# Patient Record
Sex: MALE | Race: WHITE | NOT HISPANIC OR LATINO | URBAN - METROPOLITAN AREA
[De-identification: names, ages, dates, MRNs, and addresses within clinical notes are randomized per-mention and may not be internally consistent; named-entity substitution may affect disease eponyms.]

---

## 2017-01-03 ENCOUNTER — INPATIENT (INPATIENT)
Facility: HOSPITAL | Age: 67
LOS: 7 days | Discharge: HOME | DRG: 872 | End: 2017-01-11
Attending: HOSPITALIST | Admitting: HOSPITALIST
Payer: MEDICARE

## 2017-01-03 VITALS
SYSTOLIC BLOOD PRESSURE: 146 MMHG | TEMPERATURE: 97 F | HEART RATE: 116 BPM | HEIGHT: 70 IN | WEIGHT: 184.97 LBS | RESPIRATION RATE: 22 BRPM | DIASTOLIC BLOOD PRESSURE: 95 MMHG | OXYGEN SATURATION: 98 %

## 2017-01-03 LAB
ALBUMIN SERPL ELPH-MCNC: 4.1 G/DL — SIGNIFICANT CHANGE UP (ref 3.3–5)
ALP SERPL-CCNC: 61 U/L — SIGNIFICANT CHANGE UP (ref 40–120)
ALT FLD-CCNC: 17 U/L RC — SIGNIFICANT CHANGE UP (ref 10–45)
ANION GAP SERPL CALC-SCNC: 23 MMOL/L — HIGH (ref 5–17)
APPEARANCE UR: ABNORMAL
APTT BLD: 27.7 SEC — SIGNIFICANT CHANGE UP (ref 27.5–37.4)
AST SERPL-CCNC: 19 U/L — SIGNIFICANT CHANGE UP (ref 10–40)
BASOPHILS # BLD AUTO: 0 K/UL — SIGNIFICANT CHANGE UP (ref 0–0.2)
BASOPHILS NFR BLD AUTO: 0 % — SIGNIFICANT CHANGE UP (ref 0–2)
BILIRUB SERPL-MCNC: 1.6 MG/DL — HIGH (ref 0.2–1.2)
BILIRUB UR-MCNC: ABNORMAL
BUN SERPL-MCNC: 44 MG/DL — HIGH (ref 7–23)
CALCIUM SERPL-MCNC: 9.7 MG/DL — SIGNIFICANT CHANGE UP (ref 8.4–10.5)
CHLORIDE SERPL-SCNC: 96 MMOL/L — SIGNIFICANT CHANGE UP (ref 96–108)
CO2 SERPL-SCNC: 17 MMOL/L — LOW (ref 22–31)
COLOR SPEC: YELLOW — SIGNIFICANT CHANGE UP
CREAT SERPL-MCNC: 2.01 MG/DL — HIGH (ref 0.5–1.3)
DIFF PNL FLD: ABNORMAL
EOSINOPHIL # BLD AUTO: 0 K/UL — SIGNIFICANT CHANGE UP (ref 0–0.5)
EOSINOPHIL NFR BLD AUTO: 0.1 % — SIGNIFICANT CHANGE UP (ref 0–6)
GAS PNL BLDV: SIGNIFICANT CHANGE UP
GLUCOSE SERPL-MCNC: 136 MG/DL — HIGH (ref 70–99)
GLUCOSE UR QL: NEGATIVE — SIGNIFICANT CHANGE UP
HCT VFR BLD CALC: 40.5 % — SIGNIFICANT CHANGE UP (ref 39–50)
HGB BLD-MCNC: 13.8 G/DL — SIGNIFICANT CHANGE UP (ref 13–17)
INR BLD: 1.14 RATIO — SIGNIFICANT CHANGE UP (ref 0.88–1.16)
KETONES UR-MCNC: ABNORMAL
LEUKOCYTE ESTERASE UR-ACNC: NEGATIVE — SIGNIFICANT CHANGE UP
LYMPHOCYTES # BLD AUTO: 0.6 K/UL — LOW (ref 1–3.3)
LYMPHOCYTES # BLD AUTO: 4.1 % — LOW (ref 13–44)
MCHC RBC-ENTMCNC: 31.5 PG — SIGNIFICANT CHANGE UP (ref 27–34)
MCHC RBC-ENTMCNC: 33.9 GM/DL — SIGNIFICANT CHANGE UP (ref 32–36)
MCV RBC AUTO: 92.9 FL — SIGNIFICANT CHANGE UP (ref 80–100)
MONOCYTES # BLD AUTO: 1.7 K/UL — HIGH (ref 0–0.9)
MONOCYTES NFR BLD AUTO: 11.2 % — SIGNIFICANT CHANGE UP (ref 2–14)
NEUTROPHILS # BLD AUTO: 12.8 K/UL — HIGH (ref 1.8–7.4)
NEUTROPHILS NFR BLD AUTO: 84.6 % — HIGH (ref 43–77)
NITRITE UR-MCNC: NEGATIVE — SIGNIFICANT CHANGE UP
PH UR: 6 — SIGNIFICANT CHANGE UP (ref 4.8–8)
PLATELET # BLD AUTO: 197 K/UL — SIGNIFICANT CHANGE UP (ref 150–400)
POTASSIUM SERPL-MCNC: 3.9 MMOL/L — SIGNIFICANT CHANGE UP (ref 3.5–5.3)
POTASSIUM SERPL-SCNC: 3.9 MMOL/L — SIGNIFICANT CHANGE UP (ref 3.5–5.3)
PROT SERPL-MCNC: 8.1 G/DL — SIGNIFICANT CHANGE UP (ref 6–8.3)
PROT UR-MCNC: 500 MG/DL
PROTHROM AB SERPL-ACNC: 12.3 SEC — SIGNIFICANT CHANGE UP (ref 10–13.1)
RAPID RVP RESULT: SIGNIFICANT CHANGE UP
RBC # BLD: 4.36 M/UL — SIGNIFICANT CHANGE UP (ref 4.2–5.8)
RBC # FLD: 12.5 % — SIGNIFICANT CHANGE UP (ref 10.3–14.5)
SODIUM SERPL-SCNC: 136 MMOL/L — SIGNIFICANT CHANGE UP (ref 135–145)
SP GR SPEC: 1.02 — SIGNIFICANT CHANGE UP (ref 1.01–1.02)
UROBILINOGEN FLD QL: 2
WBC # BLD: 15.1 K/UL — HIGH (ref 3.8–10.5)
WBC # FLD AUTO: 15.1 K/UL — HIGH (ref 3.8–10.5)

## 2017-01-03 PROCEDURE — 74020: CPT | Mod: 26

## 2017-01-03 PROCEDURE — 93010 ELECTROCARDIOGRAM REPORT: CPT

## 2017-01-03 PROCEDURE — 74176 CT ABD & PELVIS W/O CONTRAST: CPT | Mod: 26

## 2017-01-03 PROCEDURE — 99285 EMERGENCY DEPT VISIT HI MDM: CPT | Mod: 25

## 2017-01-03 RX ORDER — ACETAMINOPHEN 500 MG
1000 TABLET ORAL ONCE
Qty: 0 | Refills: 0 | Status: COMPLETED | OUTPATIENT
Start: 2017-01-03 | End: 2017-01-03

## 2017-01-03 RX ORDER — HEPARIN SODIUM 5000 [USP'U]/ML
5000 INJECTION INTRAVENOUS; SUBCUTANEOUS ONCE
Qty: 0 | Refills: 0 | Status: COMPLETED | OUTPATIENT
Start: 2017-01-03 | End: 2017-01-03

## 2017-01-03 RX ORDER — SODIUM CHLORIDE 9 MG/ML
1000 INJECTION INTRAMUSCULAR; INTRAVENOUS; SUBCUTANEOUS ONCE
Qty: 0 | Refills: 0 | Status: COMPLETED | OUTPATIENT
Start: 2017-01-03 | End: 2017-01-03

## 2017-01-03 RX ADMIN — Medication 400 MILLIGRAM(S): at 21:12

## 2017-01-03 RX ADMIN — SODIUM CHLORIDE 2000 MILLILITER(S): 9 INJECTION INTRAMUSCULAR; INTRAVENOUS; SUBCUTANEOUS at 21:12

## 2017-01-03 NOTE — ED PROVIDER NOTE - MEDICAL DECISION MAKING DETAILS
Resident: abdominal pain, distention. Hx echinoccocus renal dysfunction, on cellcept. febrile. abdominal is distended, tympanic, tenderness to palpation in LLQ, right lower quadrant, epigastrium. Concern for intraabdominal infection, will get XR and CT abdominal, cbc, cmp, lipase, blood cultures, ua, ucx, coags, rvp, likely admit. Resident: 65 yo M with Hx echinococcus and renal dysfunction on cellcept here with abdominal pain, and distention; found to be febrile in the ED.  PE: abdominal is distended, tympanic, tenderness to palpation in LLQ, right lower quadrant, epigastrium. Concern for intraabdominal infection, will get XR and CT abdominal, cbc, cmp, lipase, blood cultures, ua, ucx, coags, rvp, admit. Oniel: 67 yo M with Hx echinococcus and renal dysfunction on cellcept here with abdominal pain, and distention; found to be febrile in the ED.  PE: abdominal is distended, tympanic, tenderness to palpation in LLQ, right lower quadrant, epigastrium. Concern for intraabdominal infection, will get XR and CT abdominal, cbc, cmp, lipase, blood cultures, ua, ucx, coags, rvp, admit.

## 2017-01-03 NOTE — ED PROVIDER NOTE - ATTENDING CONTRIBUTION TO CARE
I have personally seen and examined this patient. I have fully participated in the care of the patient. I have reviewed all pertinent clinical information, including history, physical exam, plan and the Resident's note and agree except as noted in the MDM.

## 2017-01-03 NOTE — ED PROVIDER NOTE - CONSTITUTIONAL, MLM
normal... Tired, well nourished, awake, alert, oriented to person, place, time/situation and in no apparent distress.

## 2017-01-03 NOTE — ED PROVIDER NOTE - PROGRESS NOTE DETAILS
Oniel: found to have thrombophlebitis on the CT of the infrarenal inferior   vena cava, bilateral common iliac veins, and bilateral external iliac   veins; discussed with surgery who consulted and recommended one bolus of heparin but no surgical intervention at this time with request for admission to medicine for fever and abdominal pain.

## 2017-01-03 NOTE — ED ADULT NURSE NOTE - OBJECTIVE STATEMENT
66 year old male with co cough generalized body aches and pain worse in the supra pubic area x about 1 week, seen by PMD sent in for further evaluation. denies fever or chills no cp or sob positive coughing dry non productive lungs cta no CP abd soft mild tenderness supra publicly bilateral. reports some discomfort on urination no blood in urine pending MD exam will continue to monitor

## 2017-01-03 NOTE — ED PROVIDER NOTE - OBJECTIVE STATEMENT
Resident: 66y M PMH Hx echinococcus s/p cyst resection, kidney dysfunction on Cellcept presents with lower abdominal pain x 1wk. Pain started when he returned from [facility], is constant, severe, wraps around to lower back, associated with loss of appetite. Also complains of non-productive cough, rhinorrhea. Was seen by urgent med several days ago, was prescribed nasal spray and pills which he cannot remember. Denies fever, chills.    Nati White, PMD Eron Lemos

## 2017-01-03 NOTE — ED PROVIDER NOTE - NOTES
Covering physician [phone] Covering physician [phone], discussed patient's condition with Dr. Arias. Resident: Spoke to covering nephrologist, who read through patient's records. 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 Receives Cellcept 250mg bid. Last Cr 1.8 from oct 28 2016.

## 2017-01-04 DIAGNOSIS — R10.9 UNSPECIFIED ABDOMINAL PAIN: ICD-10-CM

## 2017-01-04 DIAGNOSIS — N28.9 DISORDER OF KIDNEY AND URETER, UNSPECIFIED: ICD-10-CM

## 2017-01-04 DIAGNOSIS — I10 ESSENTIAL (PRIMARY) HYPERTENSION: ICD-10-CM

## 2017-01-04 DIAGNOSIS — A41.9 SEPSIS, UNSPECIFIED ORGANISM: ICD-10-CM

## 2017-01-04 DIAGNOSIS — N17.9 ACUTE KIDNEY FAILURE, UNSPECIFIED: ICD-10-CM

## 2017-01-04 DIAGNOSIS — Z41.8 ENCOUNTER FOR OTHER PROCEDURES FOR PURPOSES OTHER THAN REMEDYING HEALTH STATE: ICD-10-CM

## 2017-01-04 DIAGNOSIS — R05 COUGH: ICD-10-CM

## 2017-01-04 LAB
CULTURE RESULTS: SIGNIFICANT CHANGE UP
HAV IGM SER-ACNC: SIGNIFICANT CHANGE UP
HBV CORE IGM SER-ACNC: SIGNIFICANT CHANGE UP
HBV SURFACE AB SER-ACNC: SIGNIFICANT CHANGE UP
HBV SURFACE AG SER-ACNC: SIGNIFICANT CHANGE UP
HCV AB S/CO SERPL IA: 0.17 S/CO — SIGNIFICANT CHANGE UP
HCV AB SERPL-IMP: SIGNIFICANT CHANGE UP
SPECIMEN SOURCE: SIGNIFICANT CHANGE UP

## 2017-01-04 PROCEDURE — 99222 1ST HOSP IP/OBS MODERATE 55: CPT

## 2017-01-04 PROCEDURE — 99223 1ST HOSP IP/OBS HIGH 75: CPT | Mod: GC

## 2017-01-04 PROCEDURE — 74185 MRA ABD W OR W/O CNTRST: CPT | Mod: 26

## 2017-01-04 PROCEDURE — 71010: CPT | Mod: 26

## 2017-01-04 PROCEDURE — 93970 EXTREMITY STUDY: CPT | Mod: 26

## 2017-01-04 RX ORDER — SENNA PLUS 8.6 MG/1
1 TABLET ORAL DAILY
Qty: 0 | Refills: 0 | Status: DISCONTINUED | OUTPATIENT
Start: 2017-01-04 | End: 2017-01-11

## 2017-01-04 RX ORDER — PIPERACILLIN AND TAZOBACTAM 4; .5 G/20ML; G/20ML
3.38 INJECTION, POWDER, LYOPHILIZED, FOR SOLUTION INTRAVENOUS EVERY 12 HOURS
Qty: 0 | Refills: 0 | Status: DISCONTINUED | OUTPATIENT
Start: 2017-01-04 | End: 2017-01-05

## 2017-01-04 RX ORDER — LANOLIN ALCOHOL/MO/W.PET/CERES
3 CREAM (GRAM) TOPICAL AT BEDTIME
Qty: 0 | Refills: 0 | Status: DISCONTINUED | OUTPATIENT
Start: 2017-01-04 | End: 2017-01-11

## 2017-01-04 RX ORDER — ACETAMINOPHEN 500 MG
650 TABLET ORAL EVERY 6 HOURS
Qty: 0 | Refills: 0 | Status: DISCONTINUED | OUTPATIENT
Start: 2017-01-04 | End: 2017-01-07

## 2017-01-04 RX ORDER — MYCOPHENOLATE MOFETIL 250 MG/1
250 CAPSULE ORAL
Qty: 0 | Refills: 0 | Status: DISCONTINUED | OUTPATIENT
Start: 2017-01-04 | End: 2017-01-11

## 2017-01-04 RX ORDER — HEPARIN SODIUM 5000 [USP'U]/ML
6500 INJECTION INTRAVENOUS; SUBCUTANEOUS EVERY 6 HOURS
Qty: 0 | Refills: 0 | Status: DISCONTINUED | OUTPATIENT
Start: 2017-01-04 | End: 2017-01-10

## 2017-01-04 RX ORDER — HEPARIN SODIUM 5000 [USP'U]/ML
5000 INJECTION INTRAVENOUS; SUBCUTANEOUS EVERY 8 HOURS
Qty: 0 | Refills: 0 | Status: DISCONTINUED | OUTPATIENT
Start: 2017-01-04 | End: 2017-01-04

## 2017-01-04 RX ORDER — LOSARTAN POTASSIUM 100 MG/1
25 TABLET, FILM COATED ORAL DAILY
Qty: 0 | Refills: 0 | Status: DISCONTINUED | OUTPATIENT
Start: 2017-01-04 | End: 2017-01-08

## 2017-01-04 RX ORDER — PIPERACILLIN AND TAZOBACTAM 4; .5 G/20ML; G/20ML
3.38 INJECTION, POWDER, LYOPHILIZED, FOR SOLUTION INTRAVENOUS ONCE
Qty: 0 | Refills: 0 | Status: COMPLETED | OUTPATIENT
Start: 2017-01-04 | End: 2017-01-04

## 2017-01-04 RX ORDER — HEPARIN SODIUM 5000 [USP'U]/ML
3000 INJECTION INTRAVENOUS; SUBCUTANEOUS EVERY 6 HOURS
Qty: 0 | Refills: 0 | Status: DISCONTINUED | OUTPATIENT
Start: 2017-01-04 | End: 2017-01-10

## 2017-01-04 RX ORDER — SODIUM CHLORIDE 9 MG/ML
1000 INJECTION INTRAMUSCULAR; INTRAVENOUS; SUBCUTANEOUS
Qty: 0 | Refills: 0 | Status: DISCONTINUED | OUTPATIENT
Start: 2017-01-04 | End: 2017-01-09

## 2017-01-04 RX ORDER — PANTOPRAZOLE SODIUM 20 MG/1
40 TABLET, DELAYED RELEASE ORAL
Qty: 0 | Refills: 0 | Status: DISCONTINUED | OUTPATIENT
Start: 2017-01-04 | End: 2017-01-11

## 2017-01-04 RX ORDER — HEPARIN SODIUM 5000 [USP'U]/ML
INJECTION INTRAVENOUS; SUBCUTANEOUS
Qty: 25000 | Refills: 0 | Status: DISCONTINUED | OUTPATIENT
Start: 2017-01-04 | End: 2017-01-10

## 2017-01-04 RX ORDER — VANCOMYCIN HCL 1 G
1000 VIAL (EA) INTRAVENOUS ONCE
Qty: 0 | Refills: 0 | Status: COMPLETED | OUTPATIENT
Start: 2017-01-04 | End: 2017-01-04

## 2017-01-04 RX ORDER — DOCUSATE SODIUM 100 MG
100 CAPSULE ORAL DAILY
Qty: 0 | Refills: 0 | Status: DISCONTINUED | OUTPATIENT
Start: 2017-01-04 | End: 2017-01-11

## 2017-01-04 RX ADMIN — Medication 1 TABLET(S): at 14:20

## 2017-01-04 RX ADMIN — Medication 200 MILLIGRAM(S): at 14:20

## 2017-01-04 RX ADMIN — Medication 200 MILLIGRAM(S): at 23:37

## 2017-01-04 RX ADMIN — Medication 250 MILLIGRAM(S): at 19:59

## 2017-01-04 RX ADMIN — SODIUM CHLORIDE 75 MILLILITER(S): 9 INJECTION INTRAMUSCULAR; INTRAVENOUS; SUBCUTANEOUS at 19:59

## 2017-01-04 RX ADMIN — Medication 3 MILLIGRAM(S): at 23:59

## 2017-01-04 RX ADMIN — HEPARIN SODIUM 5000 UNIT(S): 5000 INJECTION INTRAVENOUS; SUBCUTANEOUS at 00:37

## 2017-01-04 RX ADMIN — MYCOPHENOLATE MOFETIL 250 MILLIGRAM(S): 250 CAPSULE ORAL at 23:37

## 2017-01-04 RX ADMIN — HEPARIN SODIUM 1500 UNIT(S)/HR: 5000 INJECTION INTRAVENOUS; SUBCUTANEOUS at 16:28

## 2017-01-04 RX ADMIN — PANTOPRAZOLE SODIUM 40 MILLIGRAM(S): 20 TABLET, DELAYED RELEASE ORAL at 11:00

## 2017-01-04 RX ADMIN — PIPERACILLIN AND TAZOBACTAM 200 GRAM(S): 4; .5 INJECTION, POWDER, LYOPHILIZED, FOR SOLUTION INTRAVENOUS at 01:57

## 2017-01-04 RX ADMIN — HEPARIN SODIUM 5000 UNIT(S): 5000 INJECTION INTRAVENOUS; SUBCUTANEOUS at 14:20

## 2017-01-04 RX ADMIN — SENNA PLUS 1 TABLET(S): 8.6 TABLET ORAL at 14:19

## 2017-01-04 RX ADMIN — LOSARTAN POTASSIUM 25 MILLIGRAM(S): 100 TABLET, FILM COATED ORAL at 14:20

## 2017-01-04 RX ADMIN — Medication 100 MILLIGRAM(S): at 14:19

## 2017-01-04 RX ADMIN — SODIUM CHLORIDE 75 MILLILITER(S): 9 INJECTION INTRAMUSCULAR; INTRAVENOUS; SUBCUTANEOUS at 11:00

## 2017-01-04 NOTE — DISCHARGE NOTE ADULT - HOSPITAL COURSE
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 CT abdomen/pelvis: Findings are suspicious for thrombophlebitis of the infrarenal inferior vena cava, bilateral common iliac veins, and bilateral external iliac veins. Questionable thickened appearing ascending colon.       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 CT abdomen/pelvis: Findings are suspicious for thrombophlebitis of the infrarenal inferior vena cava, bilateral common iliac veins, and bilateral external iliac veins. Questionable thickened appearing ascending colon. ID was consulted and patient was started on vancomycin and zosyn for thrombophlebitis read on CT abd/pelvis. It was dc'd after ID believed there was not true thrombophlebitis but instead DVT. Patient was started on augmentin for asymptomatic pyuria. Confirmatory test for DVT with MRV showed extensive thrombus within bilateral internal/external iliac veins and bilateral common iliac veins extending into the suprarenal inferior vena cava. Duplex of b/l LE showed There is extensive deep venous thrombosis involving the right external iliac vein, common femoral vein, femoral vein, gastrocnemius vein, popliteal, tibioperoneal trunk, posterior tibial vein and peroneal veins. These veins are noncompressible. There is extensive deep venous thrombosis involving the left external iliac vein, common femoral vein, femoral vein, popliteal vein, and left calf veins. Patient was started on heparin drip. Vascular surgery stated since the clot extends into the IVC and thus wouldn't be a candidate for IVC filter and he is a poor candidate for surgical embolectomy. IR was consulted for possible intervention.         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 CT abdomen/pelvis: Findings are suspicious for thrombophlebitis of the infrarenal inferior vena cava, bilateral common iliac veins, and bilateral external iliac veins. Questionable thickened appearing ascending colon. ID was consulted and patient was started on vancomycin and zosyn for thrombophlebitis read on CT abd/pelvis. It was dc'd after ID believed there was not true thrombophlebitis but instead DVT. Confirmatory test for DVT with MRV showed extensive thrombus within bilateral internal/external iliac veins and bilateral common iliac veins extending into the suprarenal inferior vena cava. Duplex of b/l LE showed There is extensive deep venous thrombosis involving the right external iliac vein, common femoral vein, femoral vein, gastrocnemius vein, popliteal, tibioperoneal trunk, posterior tibial vein and peroneal veins. These veins are noncompressible. There is extensive deep venous thrombosis involving the left external iliac vein, common femoral vein, femoral vein, popliteal vein, and left calf veins. Patient was started on heparin drip. Vascular surgery stated since the clot extends into the IVC and thus wouldn't be a candidate for IVC filter and he is a poor candidate for surgical embolectomy. IR was consulted for possible intervention.         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 CT abdomen/pelvis: Findings are suspicious for thrombophlebitis of the infrarenal inferior vena cava, bilateral common iliac veins, and bilateral external iliac veins. Questionable thickened appearing ascending colon. ID was consulted and patient was started on vancomycin and zosyn for thrombophlebitis read on CT abd/pelvis. It was dc'd after ID believed there was not true thrombophlebitis but instead DVT. Confirmatory test for DVT with MRV showed extensive thrombus within bilateral internal/external iliac veins and bilateral common iliac veins extending into the suprarenal inferior vena cava. Duplex of b/l LE showed There is extensive deep venous thrombosis involving the right external iliac vein, common femoral vein, femoral vein, gastrocnemius vein, popliteal, tibioperoneal trunk, posterior tibial vein and peroneal veins. These veins are noncompressible. There is extensive deep venous thrombosis involving the left external iliac vein, common femoral vein, femoral vein, popliteal vein, and left calf veins. Patient was started on heparin drip. Vascular surgery stated since the clot extends into the IVC and thus wouldn't be a candidate for IVC filter and he is a poor candidate for surgical embolectomy. IR was consulted for possible intervention.     GI was curbsided and said that patient should follow up outpatient with his GI doctor for possible colonoscopy.       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 CT abdomen/pelvis: Findings are suspicious for thrombophlebitis of the infrarenal inferior vena cava, bilateral common iliac veins, and bilateral external iliac veins. Questionable thickened appearing ascending colon. ID was consulted and patient was started on vancomycin and zosyn for thrombophlebitis read on CT abd/pelvis. It was dc'd after ID believed there was not true thrombophlebitis but instead DVT. Confirmatory test for DVT with MRV showed extensive thrombus within bilateral internal/external iliac veins and bilateral common iliac veins extending into the suprarenal inferior vena cava. Duplex of b/l LE showed There is extensive deep venous thrombosis involving the right external iliac vein, common femoral vein, femoral vein, gastrocnemius vein, popliteal, tibioperoneal trunk, posterior tibial vein and peroneal veins. These veins are noncompressible. There is extensive deep venous thrombosis involving the left external iliac vein, common femoral vein, femoral vein, popliteal vein, and left calf veins. Patient was started on heparin drip. Vascular surgery stated since the clot extends into the IVC and thus wouldn't be a candidate for IVC filter and he is a poor candidate for surgical embolectomy. IR was consulted for possible intervention.     Nephrology was consulted for management of CKD with need for contrast for catheter directed thrombolysis.     GI was consulted for segmental colon thickening seen on CT abd/pelvis and they recommended follow up outpatient with his GI doctor for possible colonoscopy.       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 CT abdomen/pelvis: Findings are suspicious for thrombophlebitis of the infrarenal inferior vena cava, bilateral common iliac veins, and bilateral external iliac veins. Questionable thickened appearing ascending colon. ID was consulted and patient was started on vancomycin and zosyn for thrombophlebitis read on CT abd/pelvis. It was dc'd after ID believed there was not true thrombophlebitis but instead DVT. HIV, hep neg. Confirmatory test for DVT with MRV showed extensive thrombus within bilateral internal/external iliac veins and bilateral common iliac veins extending into the suprarenal inferior vena cava. Duplex of b/l LE showed There is extensive deep venous thrombosis involving the right external iliac vein, common femoral vein, femoral vein, gastrocnemius vein, popliteal, tibioperoneal trunk, posterior tibial vein and peroneal veins. These veins are noncompressible. There is extensive deep venous thrombosis involving the left external iliac vein, common femoral vein, femoral vein, popliteal vein, and left calf veins. Patient was started on heparin drip. Vascular surgery stated since the clot extends into the IVC and thus wouldn't be a candidate for IVC filter. IR and vasc surgery collaborated and agreed that patient is not a candidate for catheter directed thrombolysis, because risks outweigh the benefits and patient has minimal symptoms. Nephrology was consulted for management of CKD with need for contrast for catheter directed thrombolysis; however, patient ultimately did not have the procedure. GI was consulted for segmental colon thickening seen on CT abd/pelvis and they recommended follow up outpatient with his GI doctor for possible colonoscopy. Hematology stated that hypercoag workup is completed outpatient and patient was given contact information for house hematology.    During this hospitalization Cr continued to downtrend past baseline. It was decided that patient can be started on xarelto vs. warfarin. Heparin was stopped and patient was started on xarelto. 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 CT abdomen/pelvis: Findings are suspicious for thrombophlebitis of the infrarenal inferior vena cava, bilateral common iliac veins, and bilateral external iliac veins. Questionable thickened appearing ascending colon. ID was consulted and patient was started on vancomycin and zosyn for thrombophlebitis read on CT abd/pelvis. It was dc'd after ID believed there was not true thrombophlebitis but instead DVT. HIV, hep neg. Confirmatory test for DVT with MRV showed extensive thrombus within bilateral internal/external iliac veins and bilateral common iliac veins extending into the suprarenal inferior vena cava. Duplex of b/l LE showed There is extensive deep venous thrombosis involving the right external iliac vein, common femoral vein, femoral vein, gastrocnemius vein, popliteal, tibioperoneal trunk, posterior tibial vein and peroneal veins. These veins are noncompressible. There is extensive deep venous thrombosis involving the left external iliac vein, common femoral vein, femoral vein, popliteal vein, and left calf veins. TTE showed no RV strain, EF 70%. Patient was started on heparin drip. Vascular surgery stated since the clot extends into the IVC and thus wouldn't be a candidate for IVC filter. IR and vasc surgery collaborated and agreed that patient is not a candidate for catheter directed thrombolysis, because risks outweigh the benefits and patient has minimal symptoms. Nephrology was consulted for management of CKD with need for contrast for catheter directed thrombolysis; however, patient ultimately did not have the procedure. GI was consulted for segmental colon thickening seen on CT abd/pelvis and they recommended follow up outpatient with his GI doctor for possible colonoscopy. Hematology stated that hypercoag workup is completed outpatient and patient was given contact information for house hematology.    During this hospitalization Cr continued to downtrend past baseline. It was decided that patient can be started on xarelto vs. warfarin. Heparin was stopped and patient was started on xarelto.

## 2017-01-04 NOTE — H&P ADULT. - PROBLEM SELECTOR PLAN 1
maybe 22/ to CT abd/pelvis finding of poss thrombophlebitis of infrarenal IVC, common iliac vein, and external iliac vein  VS. UTI  vasc sx recs    augmentin On admission had T: 101.5, HR: 107, WBC 15.1  s/p IL LR, now resolved.  f/u blood and urine cx On admission had T: 101.5, HR: 107, WBC 15.1  s/p IL NS, now resolved.  f/u blood and urine cx

## 2017-01-04 NOTE — H&P ADULT. - RS GEN PE MLT RESP DETAILS PC
respirations non-labored/transmitted upper airway sounds/airway patent/good air movement/breath sounds equal/normal

## 2017-01-04 NOTE — DISCHARGE NOTE ADULT - PATIENT PORTAL LINK FT
“You can access the [facility] Patient Portal, offered by [facility], by registering with the following website: [email or web address]”

## 2017-01-04 NOTE — H&P ADULT. - PROBLEM SELECTOR PLAN 3
trend Cr.   c/w home cellcept  avoid nephrotoxic agents check RVP, Flu   symptomatic control with robitussin RVP negative  CXR clear   symptomatic control with robitussin

## 2017-01-04 NOTE — ED ADULT NURSE REASSESSMENT NOTE - NS ED NURSE REASSESS COMMENT FT1
Pt AAOx4, NAD, resting comfortably in bed. Pt c/o intermittent 4/10 lower abdominal cramping. Pt reports improvement in fevers, chills, weakness, body aches. Pt denies headache, dizziness, chest pain, cough, SOB, n/v/d, urinary symptoms at this time. Pt awaiting bed, safety maintained, report given to ED Holding JESSICA Hewitt.

## 2017-01-04 NOTE — DISCHARGE NOTE ADULT - PLAN OF CARE
On CT abd/pelvis you had possible thickening of your ascending colon, please follow up with your GI doctor outpatient for possible colonoscopy. medication compliance You came in with abdominal and back pain You came in with abdominal and back pain. CT abdomen/pelvis showed extensive clots in your central veins including your inferior vena cava, common iliac vein, and external iliac vein. Ultrasound of your legs showed extensive clots in both legs. You were started on IV blood thinners. Vascular surgery said you weren't a candidate for surgery and interventional radiology said ____ On admission, labs showed that you had some kidney injury. Nephrology was consulted because catheter directed thrombolysis needed contrast which can injury kidneys. follow up with your You came in with abdominal and back pain. CT abdomen/pelvis showed extensive clots in your central veins including your inferior vena cava, common iliac vein, and external iliac vein. Ultrasound of your legs showed extensive clots in both legs. You were started on IV blood thinners. Vascular surgery said interventional radiology stated you weren't a candidate for intervention. On admission, labs showed that you had some kidney injury. Nephrology was consulted because catheter directed thrombolysis needed contrast which can injury kidneys; however, you did not undergo the procedure. During this hospitalization, it was shown that your kidney levels were improving past your baseline. Please follow up with your nephrologist in 1 week. You came in with abdominal and back pain. CT abdomen/pelvis showed extensive clots in your central veins including your inferior vena cava, common iliac vein, and external iliac vein. Ultrasound of your legs showed extensive clots in both legs. You were started on IV blood thinners. Vascular surgery said interventional radiology stated you weren't a candidate for intervention. Please continue to take 15mg xarelto twice a day for 21 days and then transition to 10mg xarelto once a day. Please follow up with your PMD in 1 week. Please follow up with hematology by calling [phone] for hypercoagulable workup outpatient. Please continue taking your home cozaar. Please follow up with your PMD in 1 week. Follow up with your nephrologist On admission, labs showed that you had some kidney injury. Nephrology was consulted because catheter directed thrombolysis needed contrast which can injury kidneys; however, you did not undergo the procedure. During this hospitalization, it was shown that your kidney levels were improving past your baseline to a Creatinine of 1.2. Please follow up with your nephrologist in 1 week. You came in with abdominal and back pain. CT abdomen/pelvis showed extensive clots in your central veins including your inferior vena cava, common iliac vein, and external iliac vein. Ultrasound of your legs showed extensive clots in both legs. You were started on IV blood thinners. Vascular surgery said interventional radiology stated you weren't a candidate for intervention. Please continue to take 15mg xarelto twice a day for 21 days and then transition to 20mg xarelto once a day. Please follow up with your PMD in 1 week. Please follow up with hematology by calling [phone] for hypercoagulable workup outpatient. You came in with abdominal and back pain. CT abdomen/pelvis showed extensive clots in your central veins including your inferior vena cava, common iliac vein, and external iliac vein. Ultrasound of your legs showed extensive clots in both legs. You were started on IV blood thinners. Vascular surgery said interventional radiology stated you weren't a candidate for intervention. Ultrasound of your heart showed no heart strain and normal heart function. Please continue to take 15mg xarelto twice a day for 21 days and then transition to 20mg xarelto once a day. Please follow up with your PMD in 1 week. Please follow up with hematology by calling [phone] for hypercoagulable workup outpatient. Please follow up with your PMD in 1 week and discuss restarting your cozaar. follow up with PMD On transthoracic echo, it was found that you have a mildly dilated ascending aorta and mild-moderate aortic regurgitation. There is nothing to do in the acute setting. Please follow up with your PMD in 1 week.

## 2017-01-04 NOTE — H&P ADULT. - ATTENDING COMMENTS
#Thrombophlebitis involving IVC and common and external iliac veins:   -per vascular surgery, recommended to start heparin gtt  -will draw multiple blood cultures  -no recent central lines/hospital admissions. Unclear as to the cause of this thrombophlebitis.   -start empiric therapy with vanc/zosyn, given initial septic presentation   -ID and vascular surgery on board, appreciate recommendations    #UTI: follow up urine culture     #MALINDA: likely pre-renal, hydrate with IVF, avoid nephrotoxic drugs    #Cough: CXR negative for conslidations/effusions/infiltrates, treat symptomatically

## 2017-01-04 NOTE — ED ADULT NURSE REASSESSMENT NOTE - NS ED NURSE REASSESS COMMENT FT1
Report received from Shalini Wilson RN. Pt AAOx3, NAD, resting comfortably in bed, pt awaiting bed. Pt reports intermittent lower abdominal pain and back pain 6/10. Pt denies headache, dizziness, chest pain, SOB, n/v/d, urinary symptoms, leg pain, fevers, chills, weakness at this time. Pt awaiting bed, safety maintained.

## 2017-01-04 NOTE — H&P ADULT. - ASSESSMENT
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

## 2017-01-04 NOTE — H&P ADULT. - PROBLEM SELECTOR PLAN 2
check RVP, Flu   symptomatic control with robitussin maybe 22/ to CT abd/pelvis finding of poss thrombophlebitis of infrarenal IVC, common iliac vein, and external iliac vein  VS. UTI  vasc sx recs    augmentin maybe 22/ to CT abd/pelvis finding of poss thrombophlebitis of infrarenal IVC, common iliac vein, and external iliac vein  VS. UTI  vasc sx recs    augmentin  senna, colace

## 2017-01-04 NOTE — DISCHARGE NOTE ADULT - ADDITIONAL INSTRUCTIONS
Please follow up with hematology by calling [phone] Please follow up with your primary care physician within 1 week of discharge; check renal function with a BMP.  Please follow up with hematology by calling [phone]

## 2017-01-04 NOTE — H&P ADULT. - HISTORY OF PRESENT ILLNESS
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 As per patient he's been having this dry nonproductive cough since he was in [facility] but denies any other URI symptoms. No fevers, chills, congestion, sneezing, sore throat. Patient states he's had an episode like this 1 year ago at which time his doctor told him it may be allergies and it eventually resolved on it's own. Patient states that the day he returned from [facility] he had unbearable abdominal pain. Patient states that the pain starts in his lower back and radiates into his suprapubic abdomen. States it is a constant pain but the level of pain well increase and decrease with the most severe being 10/10 bloating like pain. Patient states that he was constipated but took 2 laxatives yesterday and had a small dark hard stool. This did not relieve his abdominal pain. States abdominal pain is not affected by food intake, but is much worse during coughing. Patient went to urgent care where he received an antispasmodic and some nasal spray which didn't help his symptoms. He started to have pain in his b/l LE extremities when walking stating that it is muscle pain. He called his PMD who told him to present to the hospital. Received flu vaccine. Patient had a c-scope 3 years ago which was negative. Never had any bright red blood per rectum. Denies any urinary symptoms. States he wakes up several times at night to urinate but that is his normal.     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 As per patient he's been having this dry nonproductive cough since he was in [facility] but denies any other URI symptoms. No fevers, chills, congestion, sneezing, sore throat. Patient states he's had an episode like this 1 year ago at which time his doctor told him it may be allergies and it eventually resolved on it's own. Patient states that the day he returned from [facility] he had unbearable abdominal pain. Patient states that the pain starts in his lower back and radiates into his suprapubic abdomen. States it is a constant pain but the level of pain well increase and decrease with the most severe being 10/10 bloating like pain. Patient states that he was constipated but took 2 laxatives yesterday and had a small dark hard stool. This did not relieve his abdominal pain. States abdominal pain is not affected by food intake, but is much worse during coughing. Patient went to urgent care where he received an antispasmodic and some nasal spray which didn't help his symptoms. He started to have pain in his b/l LE extremities when walking stating that it is muscle pain. He called his PMD who told him to present to the hospital. Received flu vaccine. Patient had a c-scope 3 years ago which was negative. Never had any bright red blood per rectum. Denies any urinary symptoms. States he wakes up several times at night to urinate but that is his normal.     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 As per patient he's been having this dry nonproductive cough since he was in [facility] but denies any other URI symptoms. No fevers, chills, congestion, sneezing, sore throat. Patient states he's had an episode like this 1 year ago at which time his doctor told him it may be allergies and it eventually resolved on it's own. Patient states that the day he returned from [facility] he had unbearable abdominal pain. Patient states that the pain starts in his lower back and radiates into his suprapubic abdomen. States it is a constant pain but the level of pain well increase and decrease with the most severe being 10/10 bloating like pain. Patient states that he was constipated but took 2 laxatives yesterday and had a small dark hard stool. This did not relieve his abdominal pain. States abdominal pain is not affected by food intake, but is much worse during coughing. Patient went to urgent care where he received an antispasmodic and some nasal spray which didn't help his symptoms. He started to have pain in his b/l LE extremities when walking stating that it is muscle pain. He called his PMD who told him to present to the hospital. Received flu vaccine. Patient had a c-scope 3 years ago which was negative. Never had any bright red blood per rectum. Denies any urinary symptoms. States he wakes up several times at night to urinate but that is his normal.     ED: T: 101.5  HR: 107  BP: 107/65  RR: 22 95% on RA  zosyn x1, IL NS, IV tylneol

## 2017-01-04 NOTE — DISCHARGE NOTE ADULT - CARE PLAN
Principal Discharge DX:	Abdominal pain  Secondary Diagnosis:	Colitis  Instructions for follow-up, activity and diet:	On CT abd/pelvis you had possible thickening of your ascending colon, please follow up with your GI doctor outpatient for possible colonoscopy. Principal Discharge DX:	Deep vein thrombosis  Goal:	medication compliance  Instructions for follow-up, activity and diet:	You came in with abdominal and back pain  Secondary Diagnosis:	Colitis  Instructions for follow-up, activity and diet:	On CT abd/pelvis you had possible thickening of your ascending colon, please follow up with your GI doctor outpatient for possible colonoscopy. Principal Discharge DX:	Deep vein thrombosis  Goal:	medication compliance  Instructions for follow-up, activity and diet:	You came in with abdominal and back pain. CT abdomen/pelvis showed extensive clots in your central veins including your inferior vena cava, common iliac vein, and external iliac vein. Ultrasound of your legs showed extensive clots in both legs. You were started on IV blood thinners. Vascular surgery said you weren't a candidate for surgery and interventional radiology said ____  Secondary Diagnosis:	Colitis  Instructions for follow-up, activity and diet:	On CT abd/pelvis you had possible thickening of your ascending colon, please follow up with your GI doctor outpatient for possible colonoscopy.  Secondary Diagnosis:	Hypertension Principal Discharge DX:	Deep vein thrombosis  Goal:	medication compliance  Instructions for follow-up, activity and diet:	You came in with abdominal and back pain. CT abdomen/pelvis showed extensive clots in your central veins including your inferior vena cava, common iliac vein, and external iliac vein. Ultrasound of your legs showed extensive clots in both legs. You were started on IV blood thinners. Vascular surgery said you weren't a candidate for surgery and interventional radiology said ____  Secondary Diagnosis:	Colitis  Instructions for follow-up, activity and diet:	On CT abd/pelvis you had possible thickening of your ascending colon, please follow up with your GI doctor outpatient for possible colonoscopy.  Secondary Diagnosis:	Hypertension  Secondary Diagnosis:	MALINDA (acute kidney injury)  Instructions for follow-up, activity and diet:	On admission, labs showed that you had some kidney injury. Nephrology was consulted because catheter directed thrombolysis needed contrast which can injury kidneys. Principal Discharge DX:	Deep vein thrombosis  Goal:	medication compliance  Instructions for follow-up, activity and diet:	You came in with abdominal and back pain. CT abdomen/pelvis showed extensive clots in your central veins including your inferior vena cava, common iliac vein, and external iliac vein. Ultrasound of your legs showed extensive clots in both legs. You were started on IV blood thinners. Vascular surgery said interventional radiology stated you weren't a candidate for intervention.  Secondary Diagnosis:	Colitis  Goal:	follow up with your  Instructions for follow-up, activity and diet:	On CT abd/pelvis you had possible thickening of your ascending colon, please follow up with your GI doctor outpatient for possible colonoscopy.  Secondary Diagnosis:	Hypertension  Secondary Diagnosis:	MALINDA (acute kidney injury)  Instructions for follow-up, activity and diet:	On admission, labs showed that you had some kidney injury. Nephrology was consulted because catheter directed thrombolysis needed contrast which can injury kidneys. Principal Discharge DX:	Deep vein thrombosis  Goal:	medication compliance  Instructions for follow-up, activity and diet:	You came in with abdominal and back pain. CT abdomen/pelvis showed extensive clots in your central veins including your inferior vena cava, common iliac vein, and external iliac vein. Ultrasound of your legs showed extensive clots in both legs. You were started on IV blood thinners. Vascular surgery said interventional radiology stated you weren't a candidate for intervention. Please continue to take 15mg xarelto twice a day for 21 days and then transition to 10mg xarelto once a day. Please follow up with your PMD in 1 week. Please follow up with hematology by calling [phone] for hypercoagulable workup outpatient.  Secondary Diagnosis:	Colitis  Goal:	follow up with your  Instructions for follow-up, activity and diet:	On CT abd/pelvis you had possible thickening of your ascending colon, please follow up with your GI doctor outpatient for possible colonoscopy.  Secondary Diagnosis:	Hypertension  Goal:	medication compliance  Secondary Diagnosis:	MALINDA (acute kidney injury)  Instructions for follow-up, activity and diet:	On admission, labs showed that you had some kidney injury. Nephrology was consulted because catheter directed thrombolysis needed contrast which can injury kidneys; however, you did not undergo the procedure. During this hospitalization, it was shown that your kidney levels were improving past your baseline. Please follow up with your nephrologist in 1 week. Principal Discharge DX:	Deep vein thrombosis  Goal:	medication compliance  Instructions for follow-up, activity and diet:	You came in with abdominal and back pain. CT abdomen/pelvis showed extensive clots in your central veins including your inferior vena cava, common iliac vein, and external iliac vein. Ultrasound of your legs showed extensive clots in both legs. You were started on IV blood thinners. Vascular surgery said interventional radiology stated you weren't a candidate for intervention. Please continue to take 15mg xarelto twice a day for 21 days and then transition to 20mg xarelto once a day. Please follow up with your PMD in 1 week. Please follow up with hematology by calling [phone] for hypercoagulable workup outpatient.  Secondary Diagnosis:	Colitis  Goal:	follow up with your  Instructions for follow-up, activity and diet:	On CT abd/pelvis you had possible thickening of your ascending colon, please follow up with your GI doctor outpatient for possible colonoscopy.  Secondary Diagnosis:	Hypertension  Goal:	medication compliance  Instructions for follow-up, activity and diet:	Please continue taking your home cozaar. Please follow up with your PMD in 1 week.  Secondary Diagnosis:	MALINDA (acute kidney injury)  Goal:	Follow up with your nephrologist  Instructions for follow-up, activity and diet:	On admission, labs showed that you had some kidney injury. Nephrology was consulted because catheter directed thrombolysis needed contrast which can injury kidneys; however, you did not undergo the procedure. During this hospitalization, it was shown that your kidney levels were improving past your baseline to a Creatinine of 1.2. Please follow up with your nephrologist in 1 week. Principal Discharge DX:	Deep vein thrombosis  Goal:	medication compliance  Instructions for follow-up, activity and diet:	You came in with abdominal and back pain. CT abdomen/pelvis showed extensive clots in your central veins including your inferior vena cava, common iliac vein, and external iliac vein. Ultrasound of your legs showed extensive clots in both legs. You were started on IV blood thinners. Vascular surgery said interventional radiology stated you weren't a candidate for intervention. Please continue to take 15mg xarelto twice a day for 21 days and then transition to 20mg xarelto once a day. Please follow up with your PMD in 1 week. Please follow up with hematology by calling [phone] for hypercoagulable workup outpatient.  Secondary Diagnosis:	Colitis  Goal:	follow up with your  Instructions for follow-up, activity and diet:	On CT abd/pelvis you had possible thickening of your ascending colon, please follow up with your GI doctor outpatient for possible colonoscopy.  Secondary Diagnosis:	Hypertension  Goal:	medication compliance  Instructions for follow-up, activity and diet:	Please continue taking your home cozaar. Please follow up with your PMD in 1 week.  Secondary Diagnosis:	MALINDA (acute kidney injury)  Goal:	Follow up with your nephrologist  Instructions for follow-up, activity and diet:	On admission, labs showed that you had some kidney injury. Nephrology was consulted because catheter directed thrombolysis needed contrast which can injury kidneys; however, you did not undergo the procedure. During this hospitalization, it was shown that your kidney levels were improving past your baseline to a Creatinine of 1.2. Please follow up with your nephrologist in 1 week. Principal Discharge DX:	Deep vein thrombosis  Goal:	medication compliance  Instructions for follow-up, activity and diet:	You came in with abdominal and back pain. CT abdomen/pelvis showed extensive clots in your central veins including your inferior vena cava, common iliac vein, and external iliac vein. Ultrasound of your legs showed extensive clots in both legs. You were started on IV blood thinners. Vascular surgery said interventional radiology stated you weren't a candidate for intervention. Please continue to take 15mg xarelto twice a day for 21 days and then transition to 20mg xarelto once a day. Please follow up with your PMD in 1 week. Please follow up with hematology by calling [phone] for hypercoagulable workup outpatient.  Secondary Diagnosis:	Colitis  Goal:	follow up with your  Instructions for follow-up, activity and diet:	On CT abd/pelvis you had possible thickening of your ascending colon, please follow up with your GI doctor outpatient for possible colonoscopy.  Secondary Diagnosis:	Hypertension  Goal:	medication compliance  Instructions for follow-up, activity and diet:	Please continue taking your home cozaar. Please follow up with your PMD in 1 week.  Secondary Diagnosis:	MALINDA (acute kidney injury)  Goal:	Follow up with your nephrologist  Instructions for follow-up, activity and diet:	On admission, labs showed that you had some kidney injury. Nephrology was consulted because catheter directed thrombolysis needed contrast which can injury kidneys; however, you did not undergo the procedure. During this hospitalization, it was shown that your kidney levels were improving past your baseline to a Creatinine of 1.2. Please follow up with your nephrologist in 1 week. Principal Discharge DX:	Deep vein thrombosis  Goal:	medication compliance  Instructions for follow-up, activity and diet:	You came in with abdominal and back pain. CT abdomen/pelvis showed extensive clots in your central veins including your inferior vena cava, common iliac vein, and external iliac vein. Ultrasound of your legs showed extensive clots in both legs. You were started on IV blood thinners. Vascular surgery said interventional radiology stated you weren't a candidate for intervention. Ultrasound of your heart showed no heart strain and normal heart function. Please continue to take 15mg xarelto twice a day for 21 days and then transition to 20mg xarelto once a day. Please follow up with your PMD in 1 week. Please follow up with hematology by calling [phone] for hypercoagulable workup outpatient.  Secondary Diagnosis:	Colitis  Goal:	follow up with your  Instructions for follow-up, activity and diet:	On CT abd/pelvis you had possible thickening of your ascending colon, please follow up with your GI doctor outpatient for possible colonoscopy.  Secondary Diagnosis:	Hypertension  Goal:	medication compliance  Instructions for follow-up, activity and diet:	Please continue taking your home cozaar. Please follow up with your PMD in 1 week.  Secondary Diagnosis:	MALINDA (acute kidney injury)  Goal:	Follow up with your nephrologist  Instructions for follow-up, activity and diet:	On admission, labs showed that you had some kidney injury. Nephrology was consulted because catheter directed thrombolysis needed contrast which can injury kidneys; however, you did not undergo the procedure. During this hospitalization, it was shown that your kidney levels were improving past your baseline to a Creatinine of 1.2. Please follow up with your nephrologist in 1 week. Principal Discharge DX:	Deep vein thrombosis  Goal:	medication compliance  Instructions for follow-up, activity and diet:	You came in with abdominal and back pain. CT abdomen/pelvis showed extensive clots in your central veins including your inferior vena cava, common iliac vein, and external iliac vein. Ultrasound of your legs showed extensive clots in both legs. You were started on IV blood thinners. Vascular surgery said interventional radiology stated you weren't a candidate for intervention. Ultrasound of your heart showed no heart strain and normal heart function. Please continue to take 15mg xarelto twice a day for 21 days and then transition to 20mg xarelto once a day. Please follow up with your PMD in 1 week. Please follow up with hematology by calling [phone] for hypercoagulable workup outpatient.  Secondary Diagnosis:	Colitis  Goal:	follow up with your  Instructions for follow-up, activity and diet:	On CT abd/pelvis you had possible thickening of your ascending colon, please follow up with your GI doctor outpatient for possible colonoscopy.  Secondary Diagnosis:	Hypertension  Goal:	medication compliance  Instructions for follow-up, activity and diet:	Please continue taking your home cozaar. Please follow up with your PMD in 1 week.  Secondary Diagnosis:	MALINDA (acute kidney injury)  Goal:	Follow up with your nephrologist  Instructions for follow-up, activity and diet:	On admission, labs showed that you had some kidney injury. Nephrology was consulted because catheter directed thrombolysis needed contrast which can injury kidneys; however, you did not undergo the procedure. During this hospitalization, it was shown that your kidney levels were improving past your baseline to a Creatinine of 1.2. Please follow up with your nephrologist in 1 week. Principal Discharge DX:	Deep vein thrombosis  Goal:	medication compliance  Instructions for follow-up, activity and diet:	You came in with abdominal and back pain. CT abdomen/pelvis showed extensive clots in your central veins including your inferior vena cava, common iliac vein, and external iliac vein. Ultrasound of your legs showed extensive clots in both legs. You were started on IV blood thinners. Vascular surgery said interventional radiology stated you weren't a candidate for intervention. Ultrasound of your heart showed no heart strain and normal heart function. Please continue to take 15mg xarelto twice a day for 21 days and then transition to 20mg xarelto once a day. Please follow up with your PMD in 1 week. Please follow up with hematology by calling [phone] for hypercoagulable workup outpatient.  Secondary Diagnosis:	Colitis  Goal:	follow up with your  Instructions for follow-up, activity and diet:	On CT abd/pelvis you had possible thickening of your ascending colon, please follow up with your GI doctor outpatient for possible colonoscopy.  Secondary Diagnosis:	Hypertension  Goal:	medication compliance  Instructions for follow-up, activity and diet:	Please follow up with your PMD in 1 week and discuss restarting your cozaar.  Secondary Diagnosis:	MALINDA (acute kidney injury)  Goal:	Follow up with your nephrologist  Instructions for follow-up, activity and diet:	On admission, labs showed that you had some kidney injury. Nephrology was consulted because catheter directed thrombolysis needed contrast which can injury kidneys; however, you did not undergo the procedure. During this hospitalization, it was shown that your kidney levels were improving past your baseline to a Creatinine of 1.2. Please follow up with your nephrologist in 1 week. Principal Discharge DX:	Deep vein thrombosis  Goal:	medication compliance  Instructions for follow-up, activity and diet:	You came in with abdominal and back pain. CT abdomen/pelvis showed extensive clots in your central veins including your inferior vena cava, common iliac vein, and external iliac vein. Ultrasound of your legs showed extensive clots in both legs. You were started on IV blood thinners. Vascular surgery said interventional radiology stated you weren't a candidate for intervention. Ultrasound of your heart showed no heart strain and normal heart function. Please continue to take 15mg xarelto twice a day for 21 days and then transition to 20mg xarelto once a day. Please follow up with your PMD in 1 week. Please follow up with hematology by calling [phone] for hypercoagulable workup outpatient.  Secondary Diagnosis:	Colitis  Goal:	follow up with your  Instructions for follow-up, activity and diet:	On CT abd/pelvis you had possible thickening of your ascending colon, please follow up with your GI doctor outpatient for possible colonoscopy.  Secondary Diagnosis:	Hypertension  Goal:	medication compliance  Instructions for follow-up, activity and diet:	Please follow up with your PMD in 1 week and discuss restarting your cozaar.  Secondary Diagnosis:	MALINDA (acute kidney injury)  Goal:	Follow up with your nephrologist  Instructions for follow-up, activity and diet:	On admission, labs showed that you had some kidney injury. Nephrology was consulted because catheter directed thrombolysis needed contrast which can injury kidneys; however, you did not undergo the procedure. During this hospitalization, it was shown that your kidney levels were improving past your baseline to a Creatinine of 1.2. Please follow up with your nephrologist in 1 week.  Secondary Diagnosis:	Aortic regurgitation  Goal:	follow up with PMD  Instructions for follow-up, activity and diet:	On transthoracic echo, it was found that you have a mildly dilated ascending aorta and mild-moderate aortic regurgitation. There is nothing to do in the acute setting. Please follow up with your PMD in 1 week. Principal Discharge DX:	Deep vein thrombosis  Goal:	medication compliance  Instructions for follow-up, activity and diet:	You came in with abdominal and back pain. CT abdomen/pelvis showed extensive clots in your central veins including your inferior vena cava, common iliac vein, and external iliac vein. Ultrasound of your legs showed extensive clots in both legs. You were started on IV blood thinners. Vascular surgery said interventional radiology stated you weren't a candidate for intervention. Ultrasound of your heart showed no heart strain and normal heart function. Please continue to take 15mg xarelto twice a day for 21 days and then transition to 20mg xarelto once a day. Please follow up with your PMD in 1 week. Please follow up with hematology by calling [phone] for hypercoagulable workup outpatient.  Secondary Diagnosis:	Colitis  Goal:	follow up with your  Instructions for follow-up, activity and diet:	On CT abd/pelvis you had possible thickening of your ascending colon, please follow up with your GI doctor outpatient for possible colonoscopy.  Secondary Diagnosis:	Hypertension  Goal:	medication compliance  Instructions for follow-up, activity and diet:	Please follow up with your PMD in 1 week and discuss restarting your cozaar.  Secondary Diagnosis:	MALINDA (acute kidney injury)  Goal:	Follow up with your nephrologist  Instructions for follow-up, activity and diet:	On admission, labs showed that you had some kidney injury. Nephrology was consulted because catheter directed thrombolysis needed contrast which can injury kidneys; however, you did not undergo the procedure. During this hospitalization, it was shown that your kidney levels were improving past your baseline to a Creatinine of 1.2. Please follow up with your nephrologist in 1 week.  Secondary Diagnosis:	Aortic regurgitation  Goal:	follow up with PMD  Instructions for follow-up, activity and diet:	On transthoracic echo, it was found that you have a mildly dilated ascending aorta and mild-moderate aortic regurgitation. There is nothing to do in the acute setting. Please follow up with your PMD in 1 week.

## 2017-01-04 NOTE — DISCHARGE NOTE ADULT - MEDICATION SUMMARY - MEDICATIONS TO TAKE
I will START or STAY ON the medications listed below when I get home from the hospital:    Cozaar  --  by mouth   -- Indication: For Hypertension    rivaroxaban 15 mg oral tablet  -- 1 tab(s) by mouth 2 times a day  -- Indication: For Deep vein thrombosis    loratadine 10 mg oral tablet  -- 1 tab(s) by mouth once a day  -- Indication: For Cough    benzonatate 100 mg oral capsule  -- 2 cap(s) by mouth 3 times a day  -- Indication: For Cough    CellCept  --  by mouth   -- Indication: For Kidney disease    fluticasone 50 mcg/inh nasal spray  -- 1 spray(s) into nose 2 times a day  -- Indication: For Cough    NexIUM  --  by mouth   -- Indication: For Gastroesophageal Reflux    Calcium 500+D  --  by mouth   -- Indication: For Need for prophylactic measure I will START or STAY ON the medications listed below when I get home from the hospital:    rivaroxaban 15 mg oral tablet  -- 1 tab(s) by mouth 2 times a day  -- Indication: For Deep vein thrombosis    loratadine 10 mg oral tablet  -- 1 tab(s) by mouth once a day  -- Indication: For Cough    benzonatate 100 mg oral capsule  -- 2 cap(s) by mouth 3 times a day  -- Indication: For Cough    CellCept  -- 250 milligram(s) by mouth 2 times a day  -- Indication: For Kidney disease    fluticasone 50 mcg/inh nasal spray  -- 1 spray(s) into nose 2 times a day  -- Indication: For Cough    NexIUM  -- 20 milligram(s) by mouth once a day  -- Indication: For GERD    Calcium 500+D  -- 1 tab(s) by mouth once a day  -- Indication: For Need for prophylactic measure

## 2017-01-04 NOTE — PROVIDER CONTACT NOTE (MEDICATION) - BACKGROUND
Pt transferred from [facility] @ 1748 on 1/4 to 5 juan. Hep gtt started @ 1628 at 15mL/hr but no bolus given as per order for aPTT of 27.7. Last aPTT was drawn on 1/3 @ 2110 = 27.7; no current aPTT drawn on

## 2017-01-04 NOTE — PROVIDER CONTACT NOTE (MEDICATION) - ASSESSMENT
1/4. Pt was given a 5000 unit IVP bolus of heparin in ED around 0030 on 1/4 and then a 5000 unit Hep SQ injection in [facility] around 1430 on 1/4.

## 2017-01-05 LAB
ANION GAP SERPL CALC-SCNC: 13 MMOL/L — SIGNIFICANT CHANGE UP (ref 5–17)
ANION GAP SERPL CALC-SCNC: 15 MMOL/L — SIGNIFICANT CHANGE UP (ref 5–17)
APTT BLD: 56.1 SEC — HIGH (ref 27.5–37.4)
APTT BLD: 57.7 SEC — HIGH (ref 27.5–37.4)
APTT BLD: 64.1 SEC — HIGH (ref 27.5–37.4)
APTT BLD: 66.3 SEC — HIGH (ref 27.5–37.4)
BASOPHILS # BLD AUTO: 0 K/UL — SIGNIFICANT CHANGE UP (ref 0–0.2)
BASOPHILS NFR BLD AUTO: 0.1 % — SIGNIFICANT CHANGE UP (ref 0–2)
BUN SERPL-MCNC: 39 MG/DL — HIGH (ref 7–23)
BUN SERPL-MCNC: 43 MG/DL — HIGH (ref 7–23)
CALCIUM SERPL-MCNC: 8.8 MG/DL — SIGNIFICANT CHANGE UP (ref 8.4–10.5)
CALCIUM SERPL-MCNC: 9.2 MG/DL — SIGNIFICANT CHANGE UP (ref 8.4–10.5)
CHLORIDE SERPL-SCNC: 101 MMOL/L — SIGNIFICANT CHANGE UP (ref 96–108)
CHLORIDE SERPL-SCNC: 102 MMOL/L — SIGNIFICANT CHANGE UP (ref 96–108)
CO2 SERPL-SCNC: 20 MMOL/L — LOW (ref 22–31)
CO2 SERPL-SCNC: 21 MMOL/L — LOW (ref 22–31)
CREAT SERPL-MCNC: 1.71 MG/DL — HIGH (ref 0.5–1.3)
CREAT SERPL-MCNC: 1.89 MG/DL — HIGH (ref 0.5–1.3)
EOSINOPHIL # BLD AUTO: 0 K/UL — SIGNIFICANT CHANGE UP (ref 0–0.5)
EOSINOPHIL NFR BLD AUTO: 0.4 % — SIGNIFICANT CHANGE UP (ref 0–6)
GLUCOSE SERPL-MCNC: 117 MG/DL — HIGH (ref 70–99)
GLUCOSE SERPL-MCNC: 122 MG/DL — HIGH (ref 70–99)
HCT VFR BLD CALC: 33.2 % — LOW (ref 39–50)
HCT VFR BLD CALC: 36.6 % — LOW (ref 39–50)
HGB BLD-MCNC: 11.6 G/DL — LOW (ref 13–17)
HGB BLD-MCNC: 12.5 G/DL — LOW (ref 13–17)
HIV 1+2 AB+HIV1 P24 AG SERPL QL IA: SIGNIFICANT CHANGE UP
LYMPHOCYTES # BLD AUTO: 0.7 K/UL — LOW (ref 1–3.3)
LYMPHOCYTES # BLD AUTO: 7 % — LOW (ref 13–44)
MCHC RBC-ENTMCNC: 32.1 PG — SIGNIFICANT CHANGE UP (ref 27–34)
MCHC RBC-ENTMCNC: 32.8 PG — SIGNIFICANT CHANGE UP (ref 27–34)
MCHC RBC-ENTMCNC: 34.1 GM/DL — SIGNIFICANT CHANGE UP (ref 32–36)
MCHC RBC-ENTMCNC: 34.9 GM/DL — SIGNIFICANT CHANGE UP (ref 32–36)
MCV RBC AUTO: 93.8 FL — SIGNIFICANT CHANGE UP (ref 80–100)
MCV RBC AUTO: 94.3 FL — SIGNIFICANT CHANGE UP (ref 80–100)
MONOCYTES # BLD AUTO: 1.2 K/UL — HIGH (ref 0–0.9)
MONOCYTES NFR BLD AUTO: 11.7 % — SIGNIFICANT CHANGE UP (ref 2–14)
NEUTROPHILS # BLD AUTO: 8 K/UL — HIGH (ref 1.8–7.4)
NEUTROPHILS NFR BLD AUTO: 80.8 % — HIGH (ref 43–77)
PLATELET # BLD AUTO: 177 K/UL — SIGNIFICANT CHANGE UP (ref 150–400)
PLATELET # BLD AUTO: 189 K/UL — SIGNIFICANT CHANGE UP (ref 150–400)
POTASSIUM SERPL-MCNC: 3.6 MMOL/L — SIGNIFICANT CHANGE UP (ref 3.5–5.3)
POTASSIUM SERPL-MCNC: 3.9 MMOL/L — SIGNIFICANT CHANGE UP (ref 3.5–5.3)
POTASSIUM SERPL-SCNC: 3.6 MMOL/L — SIGNIFICANT CHANGE UP (ref 3.5–5.3)
POTASSIUM SERPL-SCNC: 3.9 MMOL/L — SIGNIFICANT CHANGE UP (ref 3.5–5.3)
RBC # BLD: 3.54 M/UL — LOW (ref 4.2–5.8)
RBC # BLD: 3.89 M/UL — LOW (ref 4.2–5.8)
RBC # FLD: 12 % — SIGNIFICANT CHANGE UP (ref 10.3–14.5)
RBC # FLD: 12.2 % — SIGNIFICANT CHANGE UP (ref 10.3–14.5)
SODIUM SERPL-SCNC: 136 MMOL/L — SIGNIFICANT CHANGE UP (ref 135–145)
SODIUM SERPL-SCNC: 136 MMOL/L — SIGNIFICANT CHANGE UP (ref 135–145)
WBC # BLD: 10 K/UL — SIGNIFICANT CHANGE UP (ref 3.8–10.5)
WBC # BLD: 12.2 K/UL — HIGH (ref 3.8–10.5)
WBC # FLD AUTO: 10 K/UL — SIGNIFICANT CHANGE UP (ref 3.8–10.5)
WBC # FLD AUTO: 12.2 K/UL — HIGH (ref 3.8–10.5)

## 2017-01-05 PROCEDURE — 99233 SBSQ HOSP IP/OBS HIGH 50: CPT

## 2017-01-05 PROCEDURE — 99232 SBSQ HOSP IP/OBS MODERATE 35: CPT | Mod: GC

## 2017-01-05 RX ORDER — DIPHENHYDRAMINE HCL 50 MG
25 CAPSULE ORAL EVERY 6 HOURS
Qty: 0 | Refills: 0 | Status: DISCONTINUED | OUTPATIENT
Start: 2017-01-05 | End: 2017-01-07

## 2017-01-05 RX ADMIN — HEPARIN SODIUM 1500 UNIT(S)/HR: 5000 INJECTION INTRAVENOUS; SUBCUTANEOUS at 00:58

## 2017-01-05 RX ADMIN — PIPERACILLIN AND TAZOBACTAM 25 GRAM(S): 4; .5 INJECTION, POWDER, LYOPHILIZED, FOR SOLUTION INTRAVENOUS at 06:42

## 2017-01-05 RX ADMIN — MYCOPHENOLATE MOFETIL 250 MILLIGRAM(S): 250 CAPSULE ORAL at 16:29

## 2017-01-05 RX ADMIN — SODIUM CHLORIDE 75 MILLILITER(S): 9 INJECTION INTRAMUSCULAR; INTRAVENOUS; SUBCUTANEOUS at 12:23

## 2017-01-05 RX ADMIN — Medication 1 TABLET(S): at 16:29

## 2017-01-05 RX ADMIN — SODIUM CHLORIDE 75 MILLILITER(S): 9 INJECTION INTRAMUSCULAR; INTRAVENOUS; SUBCUTANEOUS at 21:13

## 2017-01-05 RX ADMIN — HEPARIN SODIUM 3000 UNIT(S): 5000 INJECTION INTRAVENOUS; SUBCUTANEOUS at 06:54

## 2017-01-05 RX ADMIN — HEPARIN SODIUM 1700 UNIT(S)/HR: 5000 INJECTION INTRAVENOUS; SUBCUTANEOUS at 06:53

## 2017-01-05 RX ADMIN — Medication 100 MILLIGRAM(S): at 16:29

## 2017-01-05 RX ADMIN — Medication 25 MILLIGRAM(S): at 23:40

## 2017-01-05 RX ADMIN — HEPARIN SODIUM 1700 UNIT(S)/HR: 5000 INJECTION INTRAVENOUS; SUBCUTANEOUS at 14:34

## 2017-01-05 RX ADMIN — Medication 200 MILLIGRAM(S): at 23:40

## 2017-01-05 RX ADMIN — PANTOPRAZOLE SODIUM 40 MILLIGRAM(S): 20 TABLET, DELAYED RELEASE ORAL at 06:06

## 2017-01-05 RX ADMIN — MYCOPHENOLATE MOFETIL 250 MILLIGRAM(S): 250 CAPSULE ORAL at 23:40

## 2017-01-05 RX ADMIN — HEPARIN SODIUM 1700 UNIT(S)/HR: 5000 INJECTION INTRAVENOUS; SUBCUTANEOUS at 21:12

## 2017-01-05 RX ADMIN — LOSARTAN POTASSIUM 25 MILLIGRAM(S): 100 TABLET, FILM COATED ORAL at 06:06

## 2017-01-05 RX ADMIN — PIPERACILLIN AND TAZOBACTAM 25 GRAM(S): 4; .5 INJECTION, POWDER, LYOPHILIZED, FOR SOLUTION INTRAVENOUS at 00:57

## 2017-01-06 LAB
ANION GAP SERPL CALC-SCNC: 16 MMOL/L — SIGNIFICANT CHANGE UP (ref 5–17)
APTT BLD: 88.8 SEC — HIGH (ref 27.5–37.4)
BUN SERPL-MCNC: 33 MG/DL — HIGH (ref 7–23)
CALCIUM SERPL-MCNC: 8.8 MG/DL — SIGNIFICANT CHANGE UP (ref 8.4–10.5)
CHLORIDE SERPL-SCNC: 102 MMOL/L — SIGNIFICANT CHANGE UP (ref 96–108)
CO2 SERPL-SCNC: 21 MMOL/L — LOW (ref 22–31)
CREAT SERPL-MCNC: 1.59 MG/DL — HIGH (ref 0.5–1.3)
GLUCOSE SERPL-MCNC: 107 MG/DL — HIGH (ref 70–99)
HCT VFR BLD CALC: 31.5 % — LOW (ref 39–50)
HGB BLD-MCNC: 11.1 G/DL — LOW (ref 13–17)
INR BLD: 1.18 RATIO — HIGH (ref 0.88–1.16)
MCHC RBC-ENTMCNC: 33.1 PG — SIGNIFICANT CHANGE UP (ref 27–34)
MCHC RBC-ENTMCNC: 35.1 GM/DL — SIGNIFICANT CHANGE UP (ref 32–36)
MCV RBC AUTO: 94.2 FL — SIGNIFICANT CHANGE UP (ref 80–100)
PLATELET # BLD AUTO: 191 K/UL — SIGNIFICANT CHANGE UP (ref 150–400)
POTASSIUM SERPL-MCNC: 4.1 MMOL/L — SIGNIFICANT CHANGE UP (ref 3.5–5.3)
POTASSIUM SERPL-SCNC: 4.1 MMOL/L — SIGNIFICANT CHANGE UP (ref 3.5–5.3)
PROTHROM AB SERPL-ACNC: 12.8 SEC — SIGNIFICANT CHANGE UP (ref 10–13.1)
RBC # BLD: 3.35 M/UL — LOW (ref 4.2–5.8)
RBC # FLD: 12.3 % — SIGNIFICANT CHANGE UP (ref 10.3–14.5)
SODIUM SERPL-SCNC: 139 MMOL/L — SIGNIFICANT CHANGE UP (ref 135–145)
WBC # BLD: 7.4 K/UL — SIGNIFICANT CHANGE UP (ref 3.8–10.5)
WBC # FLD AUTO: 7.4 K/UL — SIGNIFICANT CHANGE UP (ref 3.8–10.5)

## 2017-01-06 PROCEDURE — 99232 SBSQ HOSP IP/OBS MODERATE 35: CPT | Mod: GC

## 2017-01-06 PROCEDURE — 99221 1ST HOSP IP/OBS SF/LOW 40: CPT

## 2017-01-06 PROCEDURE — 99232 SBSQ HOSP IP/OBS MODERATE 35: CPT

## 2017-01-06 RX ADMIN — SODIUM CHLORIDE 75 MILLILITER(S): 9 INJECTION INTRAMUSCULAR; INTRAVENOUS; SUBCUTANEOUS at 12:40

## 2017-01-06 RX ADMIN — MYCOPHENOLATE MOFETIL 250 MILLIGRAM(S): 250 CAPSULE ORAL at 12:39

## 2017-01-06 RX ADMIN — PANTOPRAZOLE SODIUM 40 MILLIGRAM(S): 20 TABLET, DELAYED RELEASE ORAL at 06:12

## 2017-01-06 RX ADMIN — Medication 200 MILLIGRAM(S): at 06:12

## 2017-01-06 RX ADMIN — LOSARTAN POTASSIUM 25 MILLIGRAM(S): 100 TABLET, FILM COATED ORAL at 06:12

## 2017-01-06 RX ADMIN — Medication 200 MILLIGRAM(S): at 14:13

## 2017-01-06 RX ADMIN — Medication 25 MILLIGRAM(S): at 21:29

## 2017-01-06 RX ADMIN — SENNA PLUS 1 TABLET(S): 8.6 TABLET ORAL at 11:22

## 2017-01-06 RX ADMIN — Medication 100 MILLIGRAM(S): at 11:22

## 2017-01-06 RX ADMIN — MYCOPHENOLATE MOFETIL 250 MILLIGRAM(S): 250 CAPSULE ORAL at 21:22

## 2017-01-06 RX ADMIN — Medication 1 TABLET(S): at 11:22

## 2017-01-06 RX ADMIN — HEPARIN SODIUM 1700 UNIT(S)/HR: 5000 INJECTION INTRAVENOUS; SUBCUTANEOUS at 07:26

## 2017-01-07 LAB
ANION GAP SERPL CALC-SCNC: 15 MMOL/L — SIGNIFICANT CHANGE UP (ref 5–17)
APPEARANCE UR: CLEAR — SIGNIFICANT CHANGE UP
APTT BLD: 67.3 SEC — HIGH (ref 27.5–37.4)
BILIRUB UR-MCNC: NEGATIVE — SIGNIFICANT CHANGE UP
BUN SERPL-MCNC: 28 MG/DL — HIGH (ref 7–23)
CALCIUM SERPL-MCNC: 9.1 MG/DL — SIGNIFICANT CHANGE UP (ref 8.4–10.5)
CHLORIDE SERPL-SCNC: 102 MMOL/L — SIGNIFICANT CHANGE UP (ref 96–108)
CO2 SERPL-SCNC: 23 MMOL/L — SIGNIFICANT CHANGE UP (ref 22–31)
COLOR SPEC: YELLOW — SIGNIFICANT CHANGE UP
COMMENT - URINE: SIGNIFICANT CHANGE UP
CREAT SERPL-MCNC: 1.35 MG/DL — HIGH (ref 0.5–1.3)
DIFF PNL FLD: ABNORMAL
EPI CELLS # UR: SIGNIFICANT CHANGE UP /HPF
GLUCOSE SERPL-MCNC: 94 MG/DL — SIGNIFICANT CHANGE UP (ref 70–99)
GLUCOSE UR QL: NEGATIVE — SIGNIFICANT CHANGE UP
HCT VFR BLD CALC: 32.5 % — LOW (ref 39–50)
HGB BLD-MCNC: 11.2 G/DL — LOW (ref 13–17)
INR BLD: 1.19 RATIO — HIGH (ref 0.88–1.16)
KETONES UR-MCNC: NEGATIVE — SIGNIFICANT CHANGE UP
LEUKOCYTE ESTERASE UR-ACNC: NEGATIVE — SIGNIFICANT CHANGE UP
MCHC RBC-ENTMCNC: 32.5 PG — SIGNIFICANT CHANGE UP (ref 27–34)
MCHC RBC-ENTMCNC: 34.5 GM/DL — SIGNIFICANT CHANGE UP (ref 32–36)
MCV RBC AUTO: 94.1 FL — SIGNIFICANT CHANGE UP (ref 80–100)
NITRITE UR-MCNC: NEGATIVE — SIGNIFICANT CHANGE UP
PH UR: 6 — SIGNIFICANT CHANGE UP (ref 4.8–8)
PLATELET # BLD AUTO: 239 K/UL — SIGNIFICANT CHANGE UP (ref 150–400)
POTASSIUM SERPL-MCNC: 3.7 MMOL/L — SIGNIFICANT CHANGE UP (ref 3.5–5.3)
POTASSIUM SERPL-SCNC: 3.7 MMOL/L — SIGNIFICANT CHANGE UP (ref 3.5–5.3)
PROT UR-MCNC: 30 MG/DL
PROTHROM AB SERPL-ACNC: 13 SEC — SIGNIFICANT CHANGE UP (ref 10–13.1)
RBC # BLD: 3.45 M/UL — LOW (ref 4.2–5.8)
RBC # FLD: 11.8 % — SIGNIFICANT CHANGE UP (ref 10.3–14.5)
RBC CASTS # UR COMP ASSIST: SIGNIFICANT CHANGE UP /HPF (ref 0–2)
SODIUM SERPL-SCNC: 140 MMOL/L — SIGNIFICANT CHANGE UP (ref 135–145)
SP GR SPEC: 1.01 — SIGNIFICANT CHANGE UP (ref 1.01–1.02)
UROBILINOGEN FLD QL: NEGATIVE — SIGNIFICANT CHANGE UP
WBC # BLD: 7.2 K/UL — SIGNIFICANT CHANGE UP (ref 3.8–10.5)
WBC # FLD AUTO: 7.2 K/UL — SIGNIFICANT CHANGE UP (ref 3.8–10.5)
WBC UR QL: SIGNIFICANT CHANGE UP /HPF (ref 0–5)

## 2017-01-07 PROCEDURE — 99223 1ST HOSP IP/OBS HIGH 75: CPT | Mod: GC

## 2017-01-07 PROCEDURE — 99232 SBSQ HOSP IP/OBS MODERATE 35: CPT | Mod: GC

## 2017-01-07 RX ORDER — DIPHENHYDRAMINE HCL 50 MG
25 CAPSULE ORAL EVERY 6 HOURS
Qty: 0 | Refills: 0 | Status: DISCONTINUED | OUTPATIENT
Start: 2017-01-07 | End: 2017-01-11

## 2017-01-07 RX ORDER — ACETAMINOPHEN 500 MG
650 TABLET ORAL EVERY 6 HOURS
Qty: 0 | Refills: 0 | Status: DISCONTINUED | OUTPATIENT
Start: 2017-01-07 | End: 2017-01-11

## 2017-01-07 RX ADMIN — HEPARIN SODIUM 1700 UNIT(S)/HR: 5000 INJECTION INTRAVENOUS; SUBCUTANEOUS at 08:37

## 2017-01-07 RX ADMIN — Medication 1 TABLET(S): at 11:53

## 2017-01-07 RX ADMIN — SENNA PLUS 1 TABLET(S): 8.6 TABLET ORAL at 21:18

## 2017-01-07 RX ADMIN — MYCOPHENOLATE MOFETIL 250 MILLIGRAM(S): 250 CAPSULE ORAL at 21:18

## 2017-01-07 RX ADMIN — Medication 200 MILLIGRAM(S): at 05:02

## 2017-01-07 RX ADMIN — MYCOPHENOLATE MOFETIL 250 MILLIGRAM(S): 250 CAPSULE ORAL at 11:53

## 2017-01-07 RX ADMIN — Medication 200 MILLIGRAM(S): at 21:18

## 2017-01-07 RX ADMIN — SODIUM CHLORIDE 75 MILLILITER(S): 9 INJECTION INTRAMUSCULAR; INTRAVENOUS; SUBCUTANEOUS at 11:53

## 2017-01-07 RX ADMIN — Medication 200 MILLIGRAM(S): at 15:06

## 2017-01-07 RX ADMIN — LOSARTAN POTASSIUM 25 MILLIGRAM(S): 100 TABLET, FILM COATED ORAL at 05:02

## 2017-01-07 RX ADMIN — Medication 100 MILLIGRAM(S): at 11:53

## 2017-01-07 RX ADMIN — Medication 3 MILLIGRAM(S): at 21:18

## 2017-01-07 RX ADMIN — Medication 25 MILLIGRAM(S): at 15:06

## 2017-01-08 LAB
ANION GAP SERPL CALC-SCNC: 11 MMOL/L — SIGNIFICANT CHANGE UP (ref 5–17)
APTT BLD: 67.9 SEC — HIGH (ref 27.5–37.4)
BUN SERPL-MCNC: 21 MG/DL — SIGNIFICANT CHANGE UP (ref 7–23)
CALCIUM SERPL-MCNC: 8.6 MG/DL — SIGNIFICANT CHANGE UP (ref 8.4–10.5)
CHLORIDE SERPL-SCNC: 105 MMOL/L — SIGNIFICANT CHANGE UP (ref 96–108)
CO2 SERPL-SCNC: 22 MMOL/L — SIGNIFICANT CHANGE UP (ref 22–31)
CREAT ?TM UR-MCNC: 85 MG/DL — SIGNIFICANT CHANGE UP
CREAT SERPL-MCNC: 1.31 MG/DL — HIGH (ref 0.5–1.3)
CULTURE RESULTS: SIGNIFICANT CHANGE UP
CULTURE RESULTS: SIGNIFICANT CHANGE UP
GLUCOSE SERPL-MCNC: 98 MG/DL — SIGNIFICANT CHANGE UP (ref 70–99)
HCT VFR BLD CALC: 29.1 % — LOW (ref 39–50)
HGB BLD-MCNC: 10.3 G/DL — LOW (ref 13–17)
INR BLD: 1.26 RATIO — HIGH (ref 0.88–1.16)
MCHC RBC-ENTMCNC: 33.1 PG — SIGNIFICANT CHANGE UP (ref 27–34)
MCHC RBC-ENTMCNC: 35.5 GM/DL — SIGNIFICANT CHANGE UP (ref 32–36)
MCV RBC AUTO: 93.3 FL — SIGNIFICANT CHANGE UP (ref 80–100)
PLATELET # BLD AUTO: 249 K/UL — SIGNIFICANT CHANGE UP (ref 150–400)
POTASSIUM SERPL-MCNC: 3.9 MMOL/L — SIGNIFICANT CHANGE UP (ref 3.5–5.3)
POTASSIUM SERPL-SCNC: 3.9 MMOL/L — SIGNIFICANT CHANGE UP (ref 3.5–5.3)
PROT ?TM UR-MCNC: 28 MG/DL — HIGH (ref 0–12)
PROT/CREAT UR-RTO: 0.3 RATIO — HIGH (ref 0–0.2)
PROTHROM AB SERPL-ACNC: 13.6 SEC — HIGH (ref 10–13.1)
RBC # BLD: 3.12 M/UL — LOW (ref 4.2–5.8)
RBC # FLD: 12.1 % — SIGNIFICANT CHANGE UP (ref 10.3–14.5)
SODIUM SERPL-SCNC: 138 MMOL/L — SIGNIFICANT CHANGE UP (ref 135–145)
SPECIMEN SOURCE: SIGNIFICANT CHANGE UP
SPECIMEN SOURCE: SIGNIFICANT CHANGE UP
WBC # BLD: 6.8 K/UL — SIGNIFICANT CHANGE UP (ref 3.8–10.5)
WBC # FLD AUTO: 6.8 K/UL — SIGNIFICANT CHANGE UP (ref 3.8–10.5)

## 2017-01-08 PROCEDURE — 99232 SBSQ HOSP IP/OBS MODERATE 35: CPT | Mod: GC

## 2017-01-08 PROCEDURE — 99222 1ST HOSP IP/OBS MODERATE 55: CPT | Mod: GC

## 2017-01-08 PROCEDURE — 99233 SBSQ HOSP IP/OBS HIGH 50: CPT | Mod: GC

## 2017-01-08 RX ORDER — ACETYLCYSTEINE 200 MG/ML
1200 VIAL (ML) MISCELLANEOUS EVERY 12 HOURS
Qty: 0 | Refills: 0 | Status: DISCONTINUED | OUTPATIENT
Start: 2017-01-08 | End: 2017-01-09

## 2017-01-08 RX ORDER — POLYETHYLENE GLYCOL 3350 17 G/17G
17 POWDER, FOR SOLUTION ORAL DAILY
Qty: 0 | Refills: 0 | Status: DISCONTINUED | OUTPATIENT
Start: 2017-01-08 | End: 2017-01-11

## 2017-01-08 RX ADMIN — LOSARTAN POTASSIUM 25 MILLIGRAM(S): 100 TABLET, FILM COATED ORAL at 05:35

## 2017-01-08 RX ADMIN — POLYETHYLENE GLYCOL 3350 17 GRAM(S): 17 POWDER, FOR SOLUTION ORAL at 08:58

## 2017-01-08 RX ADMIN — SODIUM CHLORIDE 75 MILLILITER(S): 9 INJECTION INTRAMUSCULAR; INTRAVENOUS; SUBCUTANEOUS at 13:05

## 2017-01-08 RX ADMIN — Medication 200 MILLIGRAM(S): at 17:58

## 2017-01-08 RX ADMIN — MYCOPHENOLATE MOFETIL 250 MILLIGRAM(S): 250 CAPSULE ORAL at 21:45

## 2017-01-08 RX ADMIN — MYCOPHENOLATE MOFETIL 250 MILLIGRAM(S): 250 CAPSULE ORAL at 13:05

## 2017-01-08 RX ADMIN — PANTOPRAZOLE SODIUM 40 MILLIGRAM(S): 20 TABLET, DELAYED RELEASE ORAL at 05:36

## 2017-01-08 RX ADMIN — Medication 1 TABLET(S): at 13:05

## 2017-01-08 RX ADMIN — HEPARIN SODIUM 1700 UNIT(S)/HR: 5000 INJECTION INTRAVENOUS; SUBCUTANEOUS at 08:57

## 2017-01-08 RX ADMIN — Medication 1200 MILLIGRAM(S): at 17:58

## 2017-01-08 RX ADMIN — Medication 200 MILLIGRAM(S): at 05:36

## 2017-01-08 RX ADMIN — Medication 3 MILLIGRAM(S): at 21:45

## 2017-01-08 RX ADMIN — Medication 100 MILLIGRAM(S): at 21:13

## 2017-01-08 RX ADMIN — SODIUM CHLORIDE 75 MILLILITER(S): 9 INJECTION INTRAMUSCULAR; INTRAVENOUS; SUBCUTANEOUS at 05:36

## 2017-01-08 RX ADMIN — Medication 100 MILLIGRAM(S): at 13:05

## 2017-01-09 LAB
ANION GAP SERPL CALC-SCNC: 12 MMOL/L — SIGNIFICANT CHANGE UP (ref 5–17)
APTT BLD: 70.4 SEC — HIGH (ref 27.5–37.4)
BUN SERPL-MCNC: 17 MG/DL — SIGNIFICANT CHANGE UP (ref 7–23)
CALCIUM SERPL-MCNC: 8.9 MG/DL — SIGNIFICANT CHANGE UP (ref 8.4–10.5)
CHLORIDE SERPL-SCNC: 104 MMOL/L — SIGNIFICANT CHANGE UP (ref 96–108)
CO2 SERPL-SCNC: 22 MMOL/L — SIGNIFICANT CHANGE UP (ref 22–31)
CREAT SERPL-MCNC: 1.29 MG/DL — SIGNIFICANT CHANGE UP (ref 0.5–1.3)
CULTURE RESULTS: SIGNIFICANT CHANGE UP
CULTURE RESULTS: SIGNIFICANT CHANGE UP
GLUCOSE SERPL-MCNC: 100 MG/DL — HIGH (ref 70–99)
HCT VFR BLD CALC: 30.3 % — LOW (ref 39–50)
HGB BLD-MCNC: 10.1 G/DL — LOW (ref 13–17)
MCHC RBC-ENTMCNC: 31.3 PG — SIGNIFICANT CHANGE UP (ref 27–34)
MCHC RBC-ENTMCNC: 33.4 GM/DL — SIGNIFICANT CHANGE UP (ref 32–36)
MCV RBC AUTO: 93.7 FL — SIGNIFICANT CHANGE UP (ref 80–100)
PLATELET # BLD AUTO: 287 K/UL — SIGNIFICANT CHANGE UP (ref 150–400)
POTASSIUM SERPL-MCNC: 3.7 MMOL/L — SIGNIFICANT CHANGE UP (ref 3.5–5.3)
POTASSIUM SERPL-SCNC: 3.7 MMOL/L — SIGNIFICANT CHANGE UP (ref 3.5–5.3)
RBC # BLD: 3.24 M/UL — LOW (ref 4.2–5.8)
RBC # FLD: 12.3 % — SIGNIFICANT CHANGE UP (ref 10.3–14.5)
SODIUM SERPL-SCNC: 138 MMOL/L — SIGNIFICANT CHANGE UP (ref 135–145)
SPECIMEN SOURCE: SIGNIFICANT CHANGE UP
SPECIMEN SOURCE: SIGNIFICANT CHANGE UP
WBC # BLD: 7 K/UL — SIGNIFICANT CHANGE UP (ref 3.8–10.5)
WBC # FLD AUTO: 7 K/UL — SIGNIFICANT CHANGE UP (ref 3.8–10.5)

## 2017-01-09 PROCEDURE — 99232 SBSQ HOSP IP/OBS MODERATE 35: CPT | Mod: GC

## 2017-01-09 PROCEDURE — 99222 1ST HOSP IP/OBS MODERATE 55: CPT | Mod: 25

## 2017-01-09 PROCEDURE — 99233 SBSQ HOSP IP/OBS HIGH 50: CPT | Mod: GC

## 2017-01-09 PROCEDURE — 99232 SBSQ HOSP IP/OBS MODERATE 35: CPT

## 2017-01-09 PROCEDURE — 74185 MRA ABD W OR W/O CNTRST: CPT | Mod: 26

## 2017-01-09 PROCEDURE — 31575 DIAGNOSTIC LARYNGOSCOPY: CPT

## 2017-01-09 RX ORDER — LOSARTAN POTASSIUM 100 MG/1
25 TABLET, FILM COATED ORAL DAILY
Qty: 0 | Refills: 0 | Status: DISCONTINUED | OUTPATIENT
Start: 2017-01-10 | End: 2017-01-10

## 2017-01-09 RX ORDER — LOSARTAN POTASSIUM 100 MG/1
25 TABLET, FILM COATED ORAL DAILY
Qty: 0 | Refills: 0 | Status: DISCONTINUED | OUTPATIENT
Start: 2017-01-09 | End: 2017-01-09

## 2017-01-09 RX ORDER — FLUTICASONE PROPIONATE 50 MCG
1 SPRAY, SUSPENSION NASAL
Qty: 0 | Refills: 0 | Status: DISCONTINUED | OUTPATIENT
Start: 2017-01-09 | End: 2017-01-11

## 2017-01-09 RX ORDER — LORATADINE 10 MG/1
10 TABLET ORAL DAILY
Qty: 0 | Refills: 0 | Status: DISCONTINUED | OUTPATIENT
Start: 2017-01-09 | End: 2017-01-11

## 2017-01-09 RX ADMIN — MYCOPHENOLATE MOFETIL 250 MILLIGRAM(S): 250 CAPSULE ORAL at 13:32

## 2017-01-09 RX ADMIN — SENNA PLUS 1 TABLET(S): 8.6 TABLET ORAL at 21:59

## 2017-01-09 RX ADMIN — SODIUM CHLORIDE 75 MILLILITER(S): 9 INJECTION INTRAMUSCULAR; INTRAVENOUS; SUBCUTANEOUS at 08:34

## 2017-01-09 RX ADMIN — Medication 100 MILLIGRAM(S): at 13:32

## 2017-01-09 RX ADMIN — POLYETHYLENE GLYCOL 3350 17 GRAM(S): 17 POWDER, FOR SOLUTION ORAL at 13:32

## 2017-01-09 RX ADMIN — LORATADINE 10 MILLIGRAM(S): 10 TABLET ORAL at 19:31

## 2017-01-09 RX ADMIN — Medication 1200 MILLIGRAM(S): at 05:15

## 2017-01-09 RX ADMIN — PANTOPRAZOLE SODIUM 40 MILLIGRAM(S): 20 TABLET, DELAYED RELEASE ORAL at 06:16

## 2017-01-09 RX ADMIN — Medication 1 SPRAY(S): at 18:19

## 2017-01-09 RX ADMIN — Medication 3 MILLIGRAM(S): at 21:59

## 2017-01-09 RX ADMIN — HEPARIN SODIUM 1700 UNIT(S)/HR: 5000 INJECTION INTRAVENOUS; SUBCUTANEOUS at 08:41

## 2017-01-09 RX ADMIN — Medication 200 MILLIGRAM(S): at 16:29

## 2017-01-09 RX ADMIN — Medication 200 MILLIGRAM(S): at 23:30

## 2017-01-09 RX ADMIN — Medication 200 MILLIGRAM(S): at 22:00

## 2017-01-09 RX ADMIN — Medication 1 TABLET(S): at 13:33

## 2017-01-09 RX ADMIN — MYCOPHENOLATE MOFETIL 250 MILLIGRAM(S): 250 CAPSULE ORAL at 21:59

## 2017-01-09 RX ADMIN — Medication 200 MILLIGRAM(S): at 02:32

## 2017-01-09 RX ADMIN — Medication 200 MILLIGRAM(S): at 08:33

## 2017-01-09 RX ADMIN — Medication 100 MILLIGRAM(S): at 05:15

## 2017-01-10 LAB
APTT BLD: 75.1 SEC — HIGH (ref 27.5–37.4)
HCT VFR BLD CALC: 28.6 % — LOW (ref 39–50)
HGB BLD-MCNC: 10.1 G/DL — LOW (ref 13–17)
MCHC RBC-ENTMCNC: 32.7 PG — SIGNIFICANT CHANGE UP (ref 27–34)
MCHC RBC-ENTMCNC: 35.4 GM/DL — SIGNIFICANT CHANGE UP (ref 32–36)
MCV RBC AUTO: 92.5 FL — SIGNIFICANT CHANGE UP (ref 80–100)
PLATELET # BLD AUTO: 311 K/UL — SIGNIFICANT CHANGE UP (ref 150–400)
RBC # BLD: 3.09 M/UL — LOW (ref 4.2–5.8)
RBC # FLD: 11.9 % — SIGNIFICANT CHANGE UP (ref 10.3–14.5)
WBC # BLD: 6.6 K/UL — SIGNIFICANT CHANGE UP (ref 3.8–10.5)
WBC # FLD AUTO: 6.6 K/UL — SIGNIFICANT CHANGE UP (ref 3.8–10.5)

## 2017-01-10 PROCEDURE — 99232 SBSQ HOSP IP/OBS MODERATE 35: CPT | Mod: GC

## 2017-01-10 PROCEDURE — 93306 TTE W/DOPPLER COMPLETE: CPT | Mod: 26

## 2017-01-10 RX ORDER — RIVAROXABAN 15 MG-20MG
1 KIT ORAL
Qty: 42 | Refills: 0 | OUTPATIENT
Start: 2017-01-10 | End: 2017-01-31

## 2017-01-10 RX ORDER — WARFARIN SODIUM 2.5 MG/1
5 TABLET ORAL ONCE
Qty: 0 | Refills: 0 | Status: DISCONTINUED | OUTPATIENT
Start: 2017-01-10 | End: 2017-01-10

## 2017-01-10 RX ORDER — LORATADINE 10 MG/1
1 TABLET ORAL
Qty: 14 | Refills: 0 | OUTPATIENT
Start: 2017-01-10 | End: 2017-01-24

## 2017-01-10 RX ORDER — RIVAROXABAN 15 MG-20MG
1 KIT ORAL
Qty: 0 | Refills: 0 | COMMUNITY
Start: 2017-01-10

## 2017-01-10 RX ORDER — FLUTICASONE PROPIONATE 50 MCG
1 SPRAY, SUSPENSION NASAL
Qty: 1 | Refills: 0 | OUTPATIENT
Start: 2017-01-10 | End: 2017-01-24

## 2017-01-10 RX ORDER — LORATADINE 10 MG/1
1 TABLET ORAL
Qty: 0 | Refills: 0 | COMMUNITY
Start: 2017-01-10

## 2017-01-10 RX ORDER — RIVAROXABAN 15 MG-20MG
15 KIT ORAL
Qty: 0 | Refills: 0 | Status: DISCONTINUED | OUTPATIENT
Start: 2017-01-10 | End: 2017-01-11

## 2017-01-10 RX ORDER — FLUTICASONE PROPIONATE 50 MCG
1 SPRAY, SUSPENSION NASAL
Qty: 0 | Refills: 0 | COMMUNITY
Start: 2017-01-10

## 2017-01-10 RX ADMIN — Medication 200 MILLIGRAM(S): at 23:13

## 2017-01-10 RX ADMIN — HEPARIN SODIUM 1700 UNIT(S)/HR: 5000 INJECTION INTRAVENOUS; SUBCUTANEOUS at 09:10

## 2017-01-10 RX ADMIN — SENNA PLUS 1 TABLET(S): 8.6 TABLET ORAL at 21:32

## 2017-01-10 RX ADMIN — Medication 200 MILLIGRAM(S): at 17:00

## 2017-01-10 RX ADMIN — Medication 200 MILLIGRAM(S): at 05:48

## 2017-01-10 RX ADMIN — Medication 100 MILLIGRAM(S): at 12:12

## 2017-01-10 RX ADMIN — MYCOPHENOLATE MOFETIL 250 MILLIGRAM(S): 250 CAPSULE ORAL at 12:12

## 2017-01-10 RX ADMIN — RIVAROXABAN 15 MILLIGRAM(S): KIT at 17:00

## 2017-01-10 RX ADMIN — MYCOPHENOLATE MOFETIL 250 MILLIGRAM(S): 250 CAPSULE ORAL at 21:32

## 2017-01-10 RX ADMIN — Medication 1 SPRAY(S): at 05:48

## 2017-01-10 RX ADMIN — Medication 1 TABLET(S): at 12:12

## 2017-01-10 RX ADMIN — LOSARTAN POTASSIUM 25 MILLIGRAM(S): 100 TABLET, FILM COATED ORAL at 05:48

## 2017-01-10 RX ADMIN — LORATADINE 10 MILLIGRAM(S): 10 TABLET ORAL at 12:12

## 2017-01-10 RX ADMIN — POLYETHYLENE GLYCOL 3350 17 GRAM(S): 17 POWDER, FOR SOLUTION ORAL at 12:14

## 2017-01-10 RX ADMIN — PANTOPRAZOLE SODIUM 40 MILLIGRAM(S): 20 TABLET, DELAYED RELEASE ORAL at 05:49

## 2017-01-10 RX ADMIN — Medication 200 MILLIGRAM(S): at 23:05

## 2017-01-10 RX ADMIN — Medication 200 MILLIGRAM(S): at 12:12

## 2017-01-10 RX ADMIN — Medication 1 SPRAY(S): at 17:00

## 2017-01-11 VITALS
OXYGEN SATURATION: 98 % | TEMPERATURE: 98 F | HEART RATE: 77 BPM | RESPIRATION RATE: 18 BRPM | SYSTOLIC BLOOD PRESSURE: 131 MMHG | DIASTOLIC BLOOD PRESSURE: 72 MMHG

## 2017-01-11 PROCEDURE — 96374 THER/PROPH/DIAG INJ IV PUSH: CPT

## 2017-01-11 PROCEDURE — 71045 X-RAY EXAM CHEST 1 VIEW: CPT

## 2017-01-11 PROCEDURE — 82330 ASSAY OF CALCIUM: CPT

## 2017-01-11 PROCEDURE — 84295 ASSAY OF SERUM SODIUM: CPT

## 2017-01-11 PROCEDURE — 86706 HEP B SURFACE ANTIBODY: CPT

## 2017-01-11 PROCEDURE — 80074 ACUTE HEPATITIS PANEL: CPT

## 2017-01-11 PROCEDURE — 80048 BASIC METABOLIC PNL TOTAL CA: CPT

## 2017-01-11 PROCEDURE — 87086 URINE CULTURE/COLONY COUNT: CPT

## 2017-01-11 PROCEDURE — 99285 EMERGENCY DEPT VISIT HI MDM: CPT | Mod: 25

## 2017-01-11 PROCEDURE — 82803 BLOOD GASES ANY COMBINATION: CPT

## 2017-01-11 PROCEDURE — 80053 COMPREHEN METABOLIC PANEL: CPT

## 2017-01-11 PROCEDURE — 87798 DETECT AGENT NOS DNA AMP: CPT

## 2017-01-11 PROCEDURE — 84156 ASSAY OF PROTEIN URINE: CPT

## 2017-01-11 PROCEDURE — 87633 RESP VIRUS 12-25 TARGETS: CPT

## 2017-01-11 PROCEDURE — 93970 EXTREMITY STUDY: CPT

## 2017-01-11 PROCEDURE — 87040 BLOOD CULTURE FOR BACTERIA: CPT

## 2017-01-11 PROCEDURE — 93005 ELECTROCARDIOGRAM TRACING: CPT

## 2017-01-11 PROCEDURE — 82947 ASSAY GLUCOSE BLOOD QUANT: CPT

## 2017-01-11 PROCEDURE — 85730 THROMBOPLASTIN TIME PARTIAL: CPT

## 2017-01-11 PROCEDURE — 99239 HOSP IP/OBS DSCHRG MGMT >30: CPT

## 2017-01-11 PROCEDURE — 93306 TTE W/DOPPLER COMPLETE: CPT

## 2017-01-11 PROCEDURE — 87389 HIV-1 AG W/HIV-1&-2 AB AG IA: CPT

## 2017-01-11 PROCEDURE — 84132 ASSAY OF SERUM POTASSIUM: CPT

## 2017-01-11 PROCEDURE — 85610 PROTHROMBIN TIME: CPT

## 2017-01-11 PROCEDURE — C8902: CPT

## 2017-01-11 PROCEDURE — 83690 ASSAY OF LIPASE: CPT

## 2017-01-11 PROCEDURE — 94640 AIRWAY INHALATION TREATMENT: CPT

## 2017-01-11 PROCEDURE — 96375 TX/PRO/DX INJ NEW DRUG ADDON: CPT

## 2017-01-11 PROCEDURE — 85027 COMPLETE CBC AUTOMATED: CPT

## 2017-01-11 PROCEDURE — 85014 HEMATOCRIT: CPT

## 2017-01-11 PROCEDURE — C8901: CPT

## 2017-01-11 PROCEDURE — 74020: CPT

## 2017-01-11 PROCEDURE — 74176 CT ABD & PELVIS W/O CONTRAST: CPT

## 2017-01-11 PROCEDURE — 87486 CHLMYD PNEUM DNA AMP PROBE: CPT

## 2017-01-11 PROCEDURE — 83605 ASSAY OF LACTIC ACID: CPT

## 2017-01-11 PROCEDURE — 82435 ASSAY OF BLOOD CHLORIDE: CPT

## 2017-01-11 PROCEDURE — 87581 M.PNEUMON DNA AMP PROBE: CPT

## 2017-01-11 PROCEDURE — 81001 URINALYSIS AUTO W/SCOPE: CPT

## 2017-01-11 RX ORDER — ESOMEPRAZOLE MAGNESIUM 40 MG/1
0 CAPSULE, DELAYED RELEASE ORAL
Qty: 0 | Refills: 0 | COMMUNITY

## 2017-01-11 RX ORDER — MYCOPHENOLATE MOFETIL 250 MG/1
250 CAPSULE ORAL
Qty: 0 | Refills: 0 | COMMUNITY

## 2017-01-11 RX ORDER — ESOMEPRAZOLE MAGNESIUM 40 MG/1
20 CAPSULE, DELAYED RELEASE ORAL
Qty: 0 | Refills: 0 | COMMUNITY

## 2017-01-11 RX ORDER — MYCOPHENOLATE MOFETIL 250 MG/1
0 CAPSULE ORAL
Qty: 0 | Refills: 0 | COMMUNITY

## 2017-01-11 RX ORDER — LOSARTAN POTASSIUM 100 MG/1
0 TABLET, FILM COATED ORAL
Qty: 0 | Refills: 0 | COMMUNITY

## 2017-01-11 RX ADMIN — Medication 200 MILLIGRAM(S): at 05:41

## 2017-01-11 RX ADMIN — PANTOPRAZOLE SODIUM 40 MILLIGRAM(S): 20 TABLET, DELAYED RELEASE ORAL at 05:40

## 2017-01-11 RX ADMIN — Medication 100 MILLIGRAM(S): at 11:47

## 2017-01-11 RX ADMIN — LORATADINE 10 MILLIGRAM(S): 10 TABLET ORAL at 11:53

## 2017-01-11 RX ADMIN — Medication 1 TABLET(S): at 11:47

## 2017-01-11 RX ADMIN — MYCOPHENOLATE MOFETIL 250 MILLIGRAM(S): 250 CAPSULE ORAL at 11:48

## 2017-01-11 RX ADMIN — Medication 1 SPRAY(S): at 05:41

## 2017-01-11 RX ADMIN — Medication 200 MILLIGRAM(S): at 05:40

## 2017-01-11 RX ADMIN — RIVAROXABAN 15 MILLIGRAM(S): KIT at 05:40

## 2019-10-30 NOTE — H&P ADULT. - CONSTITUTIONAL DETAILS
well-groomed/well-nourished/well-developed Complex Repair And Split-Thickness Skin Graft Text: The defect edges were debeveled with a #15 scalpel blade.  The primary defect was closed partially with a complex linear closure.  Given the location of the defect, shape of the defect and the proximity to free margins a split thickness skin graft was deemed most appropriate to repair the remaining defect.  The graft was trimmed to fit the size of the remaining defect.  The graft was then placed in the primary defect, oriented appropriately, and sutured into place.

## 2021-03-15 NOTE — H&P ADULT. - PROBLEM SELECTOR PROBLEM 5
Need for prophylactic measure Hypertension Kidney disease Dapsone Pregnancy And Lactation Text: This medication is Pregnancy Category C and is not considered safe during pregnancy or breast feeding.

## 2022-03-03 NOTE — H&P ADULT. - PROBLEM SELECTOR PLAN 4
[General Appearance - Alert] : alert [General Appearance - In No Acute Distress] : in no acute distress [General Appearance - Well Nourished] : well nourished [Sclera] : the sclera and conjunctiva were normal [PERRL With Normal Accommodation] : pupils were equal in size, round, and reactive to light [Extraocular Movements] : extraocular movements were intact [Hearing Threshold Finger Rub Not [place]] : hearing was normal [Examination Of The Oral Cavity] : the lips and gums were normal [Neck Appearance] : the appearance of the neck was normal [Neck Cervical Mass (___cm)] : no neck mass was observed [Jugular Venous Distention Increased] : there was no jugular-venous distention [Respiration, Rhythm And Depth] : normal respiratory rhythm and effort [Exaggerated Use Of Accessory Muscles For Inspiration] : no accessory muscle use [Auscultation Breath Sounds / Voice Sounds] : lungs were clear to auscultation bilaterally [Heart Rate And Rhythm] : heart rate was normal and rhythm regular [Heart Sounds] : normal S1 and S2 [Heart Sounds Gallop] : no gallops [Arterial Pulses Carotid] : carotid pulses were normal with no bruits [FreeTextEntry1] : trace edema  [Bowel Sounds] : normal bowel sounds [Abdomen Soft] : soft [Abdomen Tenderness] : non-tender [No CVA Tenderness] : no ~M costovertebral angle tenderness [No Spinal Tenderness] : no spinal tenderness [Abnormal Walk] : normal gait [Nail Clubbing] : no clubbing  or cyanosis of the fingernails [Musculoskeletal - Swelling] : no joint swelling seen [Skin Color & Pigmentation] : normal skin color and pigmentation [Skin Turgor] : normal skin turgor [] : no rash [Skin Lesions] : no skin lesions [Cranial Nerves] : cranial nerves 2-12 were intact [Deep Tendon Reflexes (DTR)] : deep tendon reflexes were 2+ and symmetric [Sensation] : the sensory exam was normal to light touch and pinprick [Motor Exam] : the motor exam was normal [Oriented To Time, Place, And Person] : oriented to person, place, and time [Impaired Insight] : insight and judgment were intact [Affect] : the affect was normal [Mood] : the mood was normal c/w home amlodipine trend Cr.   c/w home cellcept  avoid nephrotoxic agents current Cr. of 2.01 with baseline of 1.6 to 1.8.   s/p 1L LR  IVF at 75cc/hr   monitor Cr. current Cr. of 2.01 with baseline of 1.6 to 1.8.   s/p 1L NS  IVF at 75cc/hr   monitor Cr.

## 2022-10-17 NOTE — ED PROVIDER NOTE - SKIN TEMP
warm Cellcept Counseling:  I discussed with the patient the risks of mycophenolate mofetil including but not limited to infection/immunosuppression, GI upset, hypokalemia, hypercholesterolemia, bone marrow suppression, lymphoproliferative disorders, malignancy, GI ulceration/bleed/perforation, colitis, interstitial lung disease, kidney failure, progressive multifocal leukoencephalopathy, and birth defects.  The patient understands that monitoring is required including a baseline creatinine and regular CBC testing. In addition, patient must alert us immediately if symptoms of infection or other concerning signs are noted.

## 2024-11-08 NOTE — H&P ADULT. - PULMONARY EMBOLUS
Patient Telephone Reminder Call    Date of call:  11/08/24  Phone numbers:  Cell number on file:    Telephone Information:   Mobile [phone]       Reached patient/confirmed appointment:  No - left message:   on voicemail  Appointment with:   Dr. Samy Powell  Reason for visit:  cholelithiasis  Can this visit be changed to a video visit:  Yes, LVM indicating can switch to video                                    no